# Patient Record
Sex: MALE | Race: BLACK OR AFRICAN AMERICAN | NOT HISPANIC OR LATINO
[De-identification: names, ages, dates, MRNs, and addresses within clinical notes are randomized per-mention and may not be internally consistent; named-entity substitution may affect disease eponyms.]

---

## 2018-05-08 PROBLEM — Z00.00 ENCOUNTER FOR PREVENTIVE HEALTH EXAMINATION: Status: ACTIVE | Noted: 2018-05-08

## 2019-03-11 ENCOUNTER — TRANSCRIPTION ENCOUNTER (OUTPATIENT)
Age: 39
End: 2019-03-11

## 2019-04-20 ENCOUNTER — TRANSCRIPTION ENCOUNTER (OUTPATIENT)
Age: 39
End: 2019-04-20

## 2019-04-21 ENCOUNTER — INPATIENT (INPATIENT)
Facility: HOSPITAL | Age: 39
LOS: 4 days | Discharge: HOME HEALTH | DRG: 66 | End: 2019-04-26
Admitting: HOSPITALIST
Payer: COMMERCIAL

## 2019-04-21 VITALS
DIASTOLIC BLOOD PRESSURE: 116 MMHG | OXYGEN SATURATION: 99 % | HEART RATE: 74 BPM | WEIGHT: 210.1 LBS | RESPIRATION RATE: 20 BRPM | SYSTOLIC BLOOD PRESSURE: 180 MMHG | TEMPERATURE: 98 F | HEIGHT: 72 IN

## 2019-04-21 LAB
ANION GAP SERPL CALC-SCNC: 14 MMOL/L — SIGNIFICANT CHANGE UP (ref 5–17)
APTT BLD: 33.8 SEC — SIGNIFICANT CHANGE UP (ref 27.5–36.3)
BUN SERPL-MCNC: 23 MG/DL — HIGH (ref 8–20)
CALCIUM SERPL-MCNC: 9.6 MG/DL — SIGNIFICANT CHANGE UP (ref 8.6–10.2)
CHLORIDE SERPL-SCNC: 98 MMOL/L — SIGNIFICANT CHANGE UP (ref 98–107)
CO2 SERPL-SCNC: 27 MMOL/L — SIGNIFICANT CHANGE UP (ref 22–29)
CREAT SERPL-MCNC: 1.05 MG/DL — SIGNIFICANT CHANGE UP (ref 0.5–1.3)
GLUCOSE SERPL-MCNC: 116 MG/DL — HIGH (ref 70–115)
HCT VFR BLD CALC: 44.8 % — SIGNIFICANT CHANGE UP (ref 42–52)
HGB BLD-MCNC: 14.8 G/DL — SIGNIFICANT CHANGE UP (ref 14–18)
INR BLD: 1.19 RATIO — HIGH (ref 0.88–1.16)
MCHC RBC-ENTMCNC: 27.2 PG — SIGNIFICANT CHANGE UP (ref 27–31)
MCHC RBC-ENTMCNC: 33 G/DL — SIGNIFICANT CHANGE UP (ref 32–36)
MCV RBC AUTO: 82.2 FL — SIGNIFICANT CHANGE UP (ref 80–94)
PLATELET # BLD AUTO: 317 K/UL — SIGNIFICANT CHANGE UP (ref 150–400)
POTASSIUM SERPL-MCNC: 3.7 MMOL/L — SIGNIFICANT CHANGE UP (ref 3.5–5.3)
POTASSIUM SERPL-SCNC: 3.7 MMOL/L — SIGNIFICANT CHANGE UP (ref 3.5–5.3)
PROTHROM AB SERPL-ACNC: 13.8 SEC — HIGH (ref 10–12.9)
RBC # BLD: 5.45 M/UL — SIGNIFICANT CHANGE UP (ref 4.6–6.2)
RBC # FLD: 14.8 % — SIGNIFICANT CHANGE UP (ref 11–15.6)
SODIUM SERPL-SCNC: 139 MMOL/L — SIGNIFICANT CHANGE UP (ref 135–145)
WBC # BLD: 14.4 K/UL — HIGH (ref 4.8–10.8)
WBC # FLD AUTO: 14.4 K/UL — HIGH (ref 4.8–10.8)

## 2019-04-21 PROCEDURE — 70450 CT HEAD/BRAIN W/O DYE: CPT | Mod: 26

## 2019-04-21 PROCEDURE — 93010 ELECTROCARDIOGRAM REPORT: CPT

## 2019-04-21 PROCEDURE — 99285 EMERGENCY DEPT VISIT HI MDM: CPT

## 2019-04-21 RX ORDER — MECLIZINE HCL 12.5 MG
25 TABLET ORAL ONCE
Qty: 0 | Refills: 0 | Status: COMPLETED | OUTPATIENT
Start: 2019-04-21 | End: 2019-04-21

## 2019-04-21 RX ORDER — ASPIRIN/CALCIUM CARB/MAGNESIUM 324 MG
325 TABLET ORAL ONCE
Qty: 0 | Refills: 0 | Status: COMPLETED | OUTPATIENT
Start: 2019-04-21 | End: 2019-04-21

## 2019-04-21 RX ORDER — ALBUTEROL 90 UG/1
1 AEROSOL, METERED ORAL EVERY 4 HOURS
Qty: 0 | Refills: 0 | Status: DISCONTINUED | OUTPATIENT
Start: 2019-04-21 | End: 2019-04-26

## 2019-04-21 RX ORDER — LABETALOL HCL 100 MG
10 TABLET ORAL ONCE
Qty: 0 | Refills: 0 | Status: COMPLETED | OUTPATIENT
Start: 2019-04-21 | End: 2019-04-21

## 2019-04-21 RX ORDER — TIOTROPIUM BROMIDE 18 UG/1
1 CAPSULE ORAL; RESPIRATORY (INHALATION) DAILY
Qty: 0 | Refills: 0 | Status: DISCONTINUED | OUTPATIENT
Start: 2019-04-21 | End: 2019-04-26

## 2019-04-21 RX ORDER — LISINOPRIL 2.5 MG/1
20 TABLET ORAL DAILY
Qty: 0 | Refills: 0 | Status: DISCONTINUED | OUTPATIENT
Start: 2019-04-21 | End: 2019-04-26

## 2019-04-21 RX ORDER — DIPHENHYDRAMINE HCL 50 MG
50 CAPSULE ORAL EVERY 4 HOURS
Qty: 0 | Refills: 0 | Status: DISCONTINUED | OUTPATIENT
Start: 2019-04-21 | End: 2019-04-24

## 2019-04-21 RX ORDER — HYDROCHLOROTHIAZIDE 25 MG
25 TABLET ORAL DAILY
Qty: 0 | Refills: 0 | Status: DISCONTINUED | OUTPATIENT
Start: 2019-04-21 | End: 2019-04-26

## 2019-04-21 RX ORDER — IPRATROPIUM/ALBUTEROL SULFATE 18-103MCG
3 AEROSOL WITH ADAPTER (GRAM) INHALATION EVERY 4 HOURS
Qty: 0 | Refills: 0 | Status: DISCONTINUED | OUTPATIENT
Start: 2019-04-21 | End: 2019-04-23

## 2019-04-21 RX ORDER — ONDANSETRON 8 MG/1
4 TABLET, FILM COATED ORAL ONCE
Qty: 0 | Refills: 0 | Status: COMPLETED | OUTPATIENT
Start: 2019-04-21 | End: 2019-04-21

## 2019-04-21 RX ADMIN — Medication 25 MILLIGRAM(S): at 20:54

## 2019-04-21 RX ADMIN — Medication 50 MILLIGRAM(S): at 20:55

## 2019-04-21 RX ADMIN — Medication 10 MILLIGRAM(S): at 20:54

## 2019-04-21 RX ADMIN — ONDANSETRON 4 MILLIGRAM(S): 8 TABLET, FILM COATED ORAL at 20:55

## 2019-04-21 RX ADMIN — Medication 325 MILLIGRAM(S): at 20:55

## 2019-04-21 NOTE — ED ADULT NURSE NOTE - CHPI ED NUR SYMPTOMS POS
SWELLING OF FACE, TONGUE/ANXIETY/right side of face NAUSEA/DIZZINESS/right side of face NAUSEA/DIZZINESS/swollen right side of face, weakness

## 2019-04-21 NOTE — ED ADULT TRIAGE NOTE - CHIEF COMPLAINT QUOTE
pt states took two pills and started a z pack. c/o right facial numbness, facial swelling and swelling of tongue. no drooling or difficulty breathing noted. no further compalints

## 2019-04-21 NOTE — ED PROVIDER NOTE - OBJECTIVE STATEMENT
Pt is a 37 y/o M, with PMHx of HTN, presenting to the ED with c/o suspected allergic reaction, onset today. Pt states he was recently placed on a zpak for treatment of congestion and URI like sxs. Took first dose yesterday and developed nausea and vomiting. Woke up this morning with right sided facial numbness and feeling like it is swollen and dizziness. Denies HA, CP, and SOB. NO hx of the same. Pt's BP elevated in the ED.

## 2019-04-21 NOTE — ED ADULT NURSE NOTE - OBJECTIVE STATEMENT
Pt presenting to the ED with c/o suspected allergic reaction, acute onset today. Pt states he was recently placed on a z-mary grace for treatment of congestion. Took first dose yesterday and then developed acute  nausea and vomiting. Pt reported he woke up today with right sided facial numbness and feeling like it is swollen and dizziness. Patient A/Ox4, VSS, denies chest pain or sob.  Respirations are even and unlabored, lungs cta, +bowel x4 quads, abdomen soft, nontender/nondistended, skin w/d/i. Pt presenting to the ED with c/o suspected allergic reaction, acute onset today. Pt states he was recently placed on a z-mary grace for treatment of congestion. Took first dose yesterday and then developed acute  nausea and vomiting. Pt reports leg weakness and couldn't walk and needed assistance from family members.  Pt reported he woke up today with right sided facial numbness and feeling like it is swollen and dizziness. Patient A/Ox4, VSS, denies chest pain or sob.  Respirations are even and unlabored, lungs cta, +bowel x4 quads, abdomen soft, nontender/nondistended, skin w/d/i. Pt presenting to the ED with c/o suspected allergic reaction, acute onset today. Pt states he was recently placed on a z-mary grace for treatment of congestion. Took first dose yesterday and then developed acute  nausea and vomiting. Pt reports dizziness, weakness, leg weakness and couldn't walk and needed assistance from family members.  Pt reported he woke up today with right sided facial numbness and feeling like it is swollen and dizziness. Patient A/Ox4, VSS, denies chest pain or sob.  Respirations are even and unlabored, lungs cta, +bowel x4 quads, abdomen soft, nontender/nondistended, skin w/d/i.

## 2019-04-21 NOTE — ED STATDOCS - NS_ ATTENDINGSCRIBEDETAILS _ED_A_ED_FT
I, Bridger Barakat, performed the initial face to face bedside interview with this patient regarding history of present illness, review of symptoms and relevant past medical, social and family history.  I completed an independent physical examination.    The history, relevant review of systems, past medical and surgical history, medical decision making, and physical examination was documented by the scribe in my presence and I attest to the accuracy of the documentation.

## 2019-04-21 NOTE — ED PROVIDER NOTE - CARE PLAN
Principal Discharge DX:	HTN (hypertension)  Secondary Diagnosis:	Vertigo  Secondary Diagnosis:	Paresthesia

## 2019-04-21 NOTE — ED ADULT NURSE NOTE - NSIMPLEMENTINTERV_GEN_ALL_ED
Implemented All Fall Risk Interventions:  [place] to call system. Call bell, personal items and telephone within reach. Instruct patient to call for assistance. Room bathroom lighting operational. Non-slip footwear when patient is off stretcher. Physically safe environment: no spills, clutter or unnecessary equipment. Stretcher in lowest position, wheels locked, appropriate side rails in place. Provide visual cue, wrist band, yellow gown, etc. Monitor gait and stability. Monitor for mental status changes and reorient to person, place, and time. Review medications for side effects contributing to fall risk. Reinforce activity limits and safety measures with patient and family.

## 2019-04-21 NOTE — ED STATDOCS - PROGRESS NOTE DETAILS
37 y/o M pt with hx of HTN presents to ED c/o right sided facial numbness and dizziness. Indicates he presented to urgent care yesterday; put on Z-Pac. Last night developed vomiting s/p taking Z-pac; he went to sleep. Last known normal is unclear, but he developed right sided facial numbness at 3:00 earlier today prior to going to bed. Indicates he woke up at 14:00 today with right sided on his whole body and was having difficulty ambulating Patient has slurred speech and is HTN in triage ( 180/116). Priority CT ordered.

## 2019-04-21 NOTE — ED ADULT NURSE NOTE - CHPI ED NUR SYMPTOMS NEG
no wheezing/no difficulty breathing/no rash/no difficulty swallowing/no throat itching no blurred vision

## 2019-04-22 DIAGNOSIS — I10 ESSENTIAL (PRIMARY) HYPERTENSION: ICD-10-CM

## 2019-04-22 DIAGNOSIS — I63.9 CEREBRAL INFARCTION, UNSPECIFIED: ICD-10-CM

## 2019-04-22 DIAGNOSIS — J32.9 CHRONIC SINUSITIS, UNSPECIFIED: ICD-10-CM

## 2019-04-22 LAB
CHOLEST SERPL-MCNC: 173 MG/DL — SIGNIFICANT CHANGE UP (ref 110–199)
HBA1C BLD-MCNC: 6.6 % — HIGH (ref 4–5.6)
HDLC SERPL-MCNC: 25 MG/DL — LOW
LIPID PNL WITH DIRECT LDL SERPL: 123 MG/DL — SIGNIFICANT CHANGE UP
RAPID RVP RESULT: SIGNIFICANT CHANGE UP
TOTAL CHOLESTEROL/HDL RATIO MEASUREMENT: 7 RATIO — SIGNIFICANT CHANGE UP (ref 3.4–9.6)
TRIGL SERPL-MCNC: 126 MG/DL — SIGNIFICANT CHANGE UP (ref 10–200)
TROPONIN T SERPL-MCNC: <0.01 NG/ML — SIGNIFICANT CHANGE UP (ref 0–0.06)

## 2019-04-22 PROCEDURE — 93880 EXTRACRANIAL BILAT STUDY: CPT | Mod: 26

## 2019-04-22 PROCEDURE — 70551 MRI BRAIN STEM W/O DYE: CPT | Mod: 26

## 2019-04-22 PROCEDURE — 12345: CPT | Mod: NC

## 2019-04-22 PROCEDURE — 99223 1ST HOSP IP/OBS HIGH 75: CPT

## 2019-04-22 RX ORDER — ENOXAPARIN SODIUM 100 MG/ML
40 INJECTION SUBCUTANEOUS EVERY 24 HOURS
Qty: 0 | Refills: 0 | Status: DISCONTINUED | OUTPATIENT
Start: 2019-04-22 | End: 2019-04-26

## 2019-04-22 RX ORDER — ACETAMINOPHEN 500 MG
650 TABLET ORAL EVERY 6 HOURS
Qty: 0 | Refills: 0 | Status: DISCONTINUED | OUTPATIENT
Start: 2019-04-22 | End: 2019-04-26

## 2019-04-22 RX ORDER — ASPIRIN/CALCIUM CARB/MAGNESIUM 324 MG
325 TABLET ORAL DAILY
Qty: 0 | Refills: 0 | Status: DISCONTINUED | OUTPATIENT
Start: 2019-04-22 | End: 2019-04-26

## 2019-04-22 RX ORDER — SIMVASTATIN 20 MG/1
40 TABLET, FILM COATED ORAL AT BEDTIME
Qty: 0 | Refills: 0 | Status: DISCONTINUED | OUTPATIENT
Start: 2019-04-22 | End: 2019-04-26

## 2019-04-22 RX ORDER — MECLIZINE HCL 12.5 MG
50 TABLET ORAL
Qty: 0 | Refills: 0 | Status: DISCONTINUED | OUTPATIENT
Start: 2019-04-22 | End: 2019-04-24

## 2019-04-22 RX ADMIN — Medication 3 MILLILITER(S): at 03:48

## 2019-04-22 RX ADMIN — Medication 325 MILLIGRAM(S): at 18:41

## 2019-04-22 RX ADMIN — ENOXAPARIN SODIUM 40 MILLIGRAM(S): 100 INJECTION SUBCUTANEOUS at 18:41

## 2019-04-22 RX ADMIN — Medication 3 MILLILITER(S): at 15:58

## 2019-04-22 RX ADMIN — SIMVASTATIN 40 MILLIGRAM(S): 20 TABLET, FILM COATED ORAL at 21:05

## 2019-04-22 RX ADMIN — Medication 50 MILLIGRAM(S): at 02:50

## 2019-04-22 RX ADMIN — LISINOPRIL 20 MILLIGRAM(S): 2.5 TABLET ORAL at 00:41

## 2019-04-22 RX ADMIN — Medication 3 MILLILITER(S): at 08:45

## 2019-04-22 RX ADMIN — Medication 3 MILLILITER(S): at 20:23

## 2019-04-22 RX ADMIN — Medication 25 MILLIGRAM(S): at 00:41

## 2019-04-22 RX ADMIN — Medication 50 MILLIGRAM(S): at 08:09

## 2019-04-22 RX ADMIN — Medication 1 TABLET(S): at 18:41

## 2019-04-22 RX ADMIN — Medication 50 MILLIGRAM(S): at 18:41

## 2019-04-22 NOTE — H&P ADULT - NSHPPHYSICALEXAM_GEN_ALL_CORE
Vital Signs Last 24 Hrs  T(C): 36.7 (22 Apr 2019 07:11), Max: 37.2 (21 Apr 2019 22:00)  T(F): 98.1 (22 Apr 2019 07:11), Max: 99 (21 Apr 2019 22:00)  HR: 60 (22 Apr 2019 07:11) (60 - 74)  BP: 130/80 (22 Apr 2019 07:11) (130/80 - 193/101)  BP(mean): --  RR: 18 (22 Apr 2019 07:11) (18 - 20)  SpO2: 100% (22 Apr 2019 03:50) (99% - 100%) Vital Signs Last 24 Hrs  T(C): 36.7 (22 Apr 2019 07:11), Max: 37.2 (21 Apr 2019 22:00)  T(F): 98.1 (22 Apr 2019 07:11), Max: 99 (21 Apr 2019 22:00)  HR: 60 (22 Apr 2019 07:11) (60 - 74)  BP: 130/80 (22 Apr 2019 07:11) (130/80 - 193/101)  BP(mean): --  RR: 18 (22 Apr 2019 07:11) (18 - 20)  SpO2: 100% (22 Apr 2019 03:50) (99% - 100%)    Constitutional/General: Well developed, well nourished, vitals reviewed  EYE: PERRL, conjunctiva clear  ENT: Good dentition, oropharynx clear  NECK: No masses, thyroid normal  RESP: CTAB, no wheezes, no rhonchi, normal effort  CV: RRR, normal S1S2, no murmur, 2+ DP pulses, no JVD, no edema  ABDOMEN: Soft, nontender, no HSM  LYMPH: No cervical or supraclavicular adenopathy  SKIN: Warm, dry, no rashes  NEURO: CN II-XII intact grossly, right facial light sensation diminished ; deferred gait, patient reports significant dizziness while sitting; slurred speech  PSYCHIATRIC: Oriented x3, normal mood and affect

## 2019-04-22 NOTE — SWALLOW BEDSIDE ASSESSMENT ADULT - SWALLOW EVAL: DIAGNOSIS
Oral stage WFL. Suspect pharyngeal dysphagia with thin liquids. Pharyngeal stage functional for solids and nectar thickened liquids.

## 2019-04-22 NOTE — H&P ADULT - HISTORY OF PRESENT ILLNESS
· HPI Objective Statement: Pt is a 37 y/o M, with PMHx of HTN, presenting to the ED with c/o suspected allergic reaction, onset today. Pt states he was recently placed on a zpak for treatment of congestion and URI like sxs. Took first dose yesterday and developed nausea and vomiting. Woke up this morning with right sided facial numbness and feeling like it is swollen and dizziness. Denies HA, CP, and SOB. NO hx of the same. Pt's BP elevated in the ED.  · Presenting Symptoms: right sided facial numbness/swelling, dizziness  · Negative Findings: no shortness of breath  · Location: face  · Laterality: right  · Duration: yesterday  · Relieving Factors: none    Above ED HPI reviewed and noted: patient and uncle at bedside, confirm above. patient reports having symptoms of dizziness, cough, fever, and loss of appetite for 1 week. One the day of arrival he developed acute onset vomiting with worsening dizziness causing him to seek medical attention. patient denies head trauma or fall.

## 2019-04-22 NOTE — CONSULT NOTE ADULT - SUBJECTIVE AND OBJECTIVE BOX
CHIEF COMPLAINT:  right facial numbness    HPI: 38yMale  · HPI Objective Statement: Pt is a 37 y/o M, with PMHx of HTN, presenting to the ED with c/o suspected allergic reaction, onset today. Pt states he was recently placed on a zpak for treatment of congestion and URI like sxs. Took first dose yesterday and developed nausea and vomiting. Woke up this morning with right sided facial numbness and feeling like it is swollen and dizziness. Denies HA, CP, and SOB. NO hx of the same. Pt's BP elevated in the ED.	  Patient still notes dizziness and right facial numbness    PAST MEDICAL & SURGICAL HISTORY:  HTN (hypertension)  DM?  No significant past surgical history    MEDICATIONS  (STANDING):  ALBUTerol    90 MICROgram(s) HFA Inhaler 1 Puff(s) Inhalation every 4 hours  ALBUTerol/ipratropium for Nebulization 3 milliLiter(s) Nebulizer every 4 hours  amoxicillin  875 milliGRAM(s)/clavulanate 1 Tablet(s) Oral two times a day  aspirin enteric coated 325 milliGRAM(s) Oral daily  enoxaparin Injectable 40 milliGRAM(s) SubCutaneous every 24 hours  hydrochlorothiazide 25 milliGRAM(s) Oral daily  lisinopril 20 milliGRAM(s) Oral daily  meclizine 50 milliGRAM(s) Oral two times a day  simvastatin 40 milliGRAM(s) Oral at bedtime  tiotropium 18 MICROgram(s) Capsule 1 Capsule(s) Inhalation daily    MEDICATIONS  (PRN):  acetaminophen   Tablet .. 650 milliGRAM(s) Oral every 6 hours PRN Temp greater or equal to 38C (100.4F), Mild Pain (1 - 3)  diphenhydrAMINE   Injectable 50 milliGRAM(s) IV Push every 4 hours PRN Rash and/or Itching    Allergies    No Known Allergies    Intolerances        FAMILY HISTORY:          SOCIAL HISTORY:    Tobacco:    Alcohol:    Drugs:          REVIEW OF SYSTEMS:    Relevant systems are negative except as noted in the chart, HPI, and PMH      VITAL SIGNS:  Vital Signs Last 24 Hrs  T(C): 37.1 (22 Apr 2019 11:44), Max: 37.2 (21 Apr 2019 22:00)  T(F): 98.8 (22 Apr 2019 11:44), Max: 99 (21 Apr 2019 22:00)  HR: 70 (22 Apr 2019 11:44) (60 - 74)  BP: 138/84 (22 Apr 2019 11:44) (130/80 - 193/101)  BP(mean): --  RR: 18 (22 Apr 2019 11:44) (18 - 20)  SpO2: 96% (22 Apr 2019 11:44) (96% - 100%)    PHYSICAL EXAMINATION:    General: Well-developed, well nourished, in no acute distress.  Cardiac:  Regular rate and rhythm. No carotid bruits appreciated.  Eyes: Fundoscopic examination was deferred.  Neurologic:  - Mental Status:  Alert, awake, oriented to person, place, and time; Speech is fluent. Language is normal. Follows commands well.  Insight and knowledge appear appropriate.  Cranial Nerves II-XII:    II:  Visual acuity is normal for age ; Visual fields are full to confrontation; Pupils are equal, round, and reactive to light.  III, IV, VI:  Extraocular movements are intact without nystagmus.  V:  Facial sensation is decreased on right fact  VII:  Face - right facial palsy  (peripheral pattern including the forehead  VIII:  Hearing is diminshed right ear  IX, X, XII:  speech is clear  XI:  Head turning and shoulder shrug are intact.  - Motor:  Strength is 5/5 x 4.   There is no pronator drift. .  - Reflexes:  2+ and symmetric at the knees.  Plantar responses flexor.  - Sensory:  Symmetric to light touch  - Coordination:  Finger-nose-finger and Ankle to shin show inacuraccy/dysmetria      LABS:                          14.8   14.4  )-----------( 317      ( 21 Apr 2019 21:02 )             44.8     21 Apr 2019 21:01    139    |  98     |  23.0   ----------------------------<  116    3.7     |  27.0   |  1.05     Ca    9.6        21 Apr 2019 21:01        PT/INR - ( 21 Apr 2019 21:02 )   PT: 13.8 sec;   INR: 1.19 ratio         PTT - ( 21 Apr 2019 21:02 )  PTT:33.8 sec      RADIOLOGY & ADDITIONAL STUDIES:    < from: MR Head No Cont (04.22.19 @ 11:40) >  FINDINGS:  There is focal diffusion abnormality at the level of the right middle   cerebellar peduncle consistent with acute infarct. Mild regional mass   effect. No evidence of hemorrhage. Normal size and configuration of the   ventricles and cortical sulci. The midline structures are intact.      There is normal gray-white differentiation. The corpus callosum, cortical   spinal tracts and other descending white matter tracts are intact.    There is normal configuration of cerebellar, mid brain, yaa and medulla   without tonsillar ectopia.    There is no acute hemorrhage or hydrocephalus. No extra-axial collections   are identified.    There is normal sella / parasellar structures, tectum and pineal region.    The major intra-arterial flow voids are patent.       There are scattered chronic mucosal thickening of the paranasal sinuses   and polypoid retention cysts and the maxillary sinuses bilaterally. No   air-fluid levels. Small left mastoid opacification/inflammatory changes    The visualized portions of the nasopharynx, and upper cervical spine are   grossly unremarkable.    IMPRESSION:    Acutenonhemorrhagic infarct in the right middle cerebellar peduncle.    Scattered mucosal disease of the paranasal sinuses and polypoid retention   cyst in the maxillary sinuses bilaterally.    Inflammatory changes left mastoid.          < end of copied text >    IMPRESSION:    Right cerebellar stroke in this 39 year old man with HTN and DM(?)  His exam shows a right bells palsy and hearing loss which are not directly explained by the cerebellar stroke.  Not typical of brainstem ishcemia either. -cranial nerves are involved (peripheral)      PLAN:  1. MRA brain and neck  2. MRI brain with tulio  3. May need LP to ro/o inflammatory etiology  4. Labs-Lyme, RPR, hemoglobin A1c, JASON, ANCA, sjogren ab etc  5. In meantime- stroke protocols

## 2019-04-22 NOTE — H&P ADULT - ASSESSMENT
r/o CVA, secondary to uncontrolled HTN, patient prescribed 3 antihypertensives but was only taking two as he though amlodipine was added only because he was having neck discomfort a few months ago.    - admit to medical unit  - neurological check  - dysphagia screen  - OOB with assistance  - please call neurology consult  - echocardiogram, a1c, lipid, carotid doppler  - Meclizine     H/O chronic sinusitis, recurrent  - IV Levaquin given  - possible drug reaction with right facial swelling, cont to monitor  - cont benadryl    H/O HTN  - resume hydralazine, lisinopril and amlodipine    H/o HLD  - cont Statin     DVT prophylaxes

## 2019-04-22 NOTE — PROGRESS NOTE ADULT - PROBLEM SELECTOR PLAN 1
Start aspirin, statin, neurology consulted. Check MRA head and neck, carotids US, ECHO. Neuro checks, cardiology evaluation. PT and speech/swallow evaluation. U Tox.

## 2019-04-22 NOTE — CONSULT NOTE ADULT - ASSESSMENT
A/P:  37yo AA male with PMH HTN, ?sleep apnea (per parents) presents to the ED with c/o right facial numbness since Friday.  Patient states that he went to urgent care Thursday, was started on Z-Anurag, and began having right facial numbness which he assumed was an allergic reaction. Symptoms have been progressively worsening to right eye "jumping", right sided decreased hearing, right facial numbness, right facial droop, right sided tingling and weakness, slurred speech, and mild expressive and receptive aphasia.  Per patient request, obtained further Hx and discussed situation with his parents Chacha ([phone]) and Ernesto ([phone]) who he resides with and are currently vacationing in [place] until Sunday 4/28 but can return earlier if necessary. EKG no acute ischemia. Acute nonhemorrhagic infarct in the right middle cerebellar peduncle, per MRI.

## 2019-04-22 NOTE — PROGRESS NOTE ADULT - ASSESSMENT
38 yr old male with hypertension admitted with complaints of facial numbness and dizziness. He was recently started on a course of Azithromycin for URI. On arrival, noted to have elevated BP systolic 180, CT head was performed which was negative for acute stroke, there was evidence of paranasal sinus disease and left mastoid opacification. He was started on Levofloxacin for URI. 38 yr old male with hypertension admitted with complaints of facial numbness and dizziness. He was recently started on a course of Azithromycin for URI. On arrival, noted to have elevated BP systolic 180, CT head was performed which was negative for acute stroke, there was evidence of paranasal sinus disease and left mastoid opacification. He was started on Levofloxacin for URI. 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 Neurology consulted.

## 2019-04-22 NOTE — CONSULT NOTE ADULT - SUBJECTIVE AND OBJECTIVE BOX
[place] [facility]                                                        Office: [address] [address]                                                       Telephone: [phone]. Fax:[phone]                                                              CARDIOLOGY CONSULTATION NOTE                                                                                             Consult requested by:  Dr. Fair    Reason for Consultation: Stroke    History obtained by: Patient and medical record     obtained: No    Chief complaint:    Patient is a 38y old  Male who presents with a chief complaint of dizziness (22 Apr 2019 08:19)      HPI:  39yo AA male with PMH HTN, ?sleep apnea (per parents) presents to the ED with c/o right facial numbness since Friday.  Patient states that he went to urgent care Thursday, was started on Z-Anurag, and began having right facial numbness which he assumed was an allergic reaction. Symptoms have been progressively worsening to right eye "jumping", right sided decreased hearing, right facial numbness, right facial droop, right sided tingling and weakness, slurred speech, and mild expressive and receptive aphasia.  Per patient request, obtained further Hx and discussed situation with his parents Chacha ([phone]) and Ernesto ([phone]) who he resides with and are currently vacationing in [place] until Sunday 4/28 but can return earlier if necessary.  Patient denies chest pain/pressure, palpitations, irregular and/or rapid heart beat, SOB, LIRIANO, syncope/near syncope, orthopnea, PND, cough, edema, n/v/d, hematuria, or hematochezia.        REVIEW OF SYMPTOMS: Cardiovascular:  See HPI. No chest pain,  No dyspnea,  No syncope,  No palpitations, No Orthopnea,      No Paroxsymal nocturnal dyspnea;  Respiratory:  No Dyspnea, No cough,     Genitourinary:  No dysuria, no hematuria; Gastrointestinal:  No nausea, no vomiting. No diarrhea.  No abdominal pain. No dark color stool, no melena ; Neurological: See HPI  Psychiatric: No agitation, no anxiety.  ALL OTHER REVIEW OF SYSTEMS ARE NEGATIVE.    ALLERGIES: Allergies    No Known Allergies    Intolerances          CURRENT MEDICATIONS:  hydrochlorothiazide 25 milliGRAM(s) Oral daily  lisinopril 20 milliGRAM(s) Oral daily    ALBUTerol    90 MICROgram(s) HFA Inhaler  ALBUTerol/ipratropium for Nebulization  tiotropium 18 MICROgram(s) Capsule   meclizine  amoxicillin  875 milliGRAM(s)/clavulanate  aspirin enteric coated  enoxaparin Injectable  simvastatin      HOME MEDICATIONS:  Lisinopril    PAST MEDICAL HISTORY  HTN (hypertension)      PAST SURGICAL HISTORY  No significant past surgical history      FAMILY HISTORY:  Mom- HTN, Pulm. HTN, MI w/2stents at age 60  Father- HTN  Paternal Grandfather- CHF      SOCIAL HISTORY:  lives with parents,     CIGARETTES:  denies     ALCOHOL: denies     DRUGS: denies    Vital Signs Last 24 Hrs  T(C): 37.1 (22 Apr 2019 11:44), Max: 37.2 (21 Apr 2019 22:00)  T(F): 98.8 (22 Apr 2019 11:44), Max: 99 (21 Apr 2019 22:00)  HR: 70 (22 Apr 2019 11:44) (60 - 74)  BP: 138/84 (22 Apr 2019 11:44) (130/80 - 193/101)  BP(mean): --  RR: 18 (22 Apr 2019 11:44) (18 - 20)  SpO2: 96% (22 Apr 2019 11:44) (96% - 100%)      PHYSICAL EXAM:  Constitutional: Comfortable . No acute distress.   HEENT: Atraumatic and normocephalic , neck is supple . no JVD. No carotid bruit. PEERL   CNS: A&Ox3. Slurred speech, mild expressive and receptive aphasia, Right facial droop, right facial decreased sensation, Right sided weakness and decreased sensation. EOMI.   Lymph Nodes: Cervical : Not palpable.  Respiratory: CTAB  Cardiovascular: S1S2 RRR. No murmur/rubs or gallop.  Gastrointestinal: Soft non-tender and non distended . +Bowel sounds. negative Trevino's sign.  Extremities: No edema.   Psychiatric: Calm . no agitation.  Skin: No skin rash/ulcers visualized to face, hands or feet.    Intake and output:     LABS:                        14.8   14.4  )-----------( 317      ( 21 Apr 2019 21:02 )             44.8     04-21    139  |  98  |  23.0<H>  ----------------------------<  116<H>  3.7   |  27.0  |  1.05    Ca    9.6      21 Apr 2019 21:01      CARDIAC MARKERS ( 22 Apr 2019 05:44 )  x     / <0.01 ng/mL / x     / x     / x      CARDIAC MARKERS ( 21 Apr 2019 21:01 )  x     / <0.01 ng/mL / x     / x     / x        ;p-BNP=  PT/INR - ( 21 Apr 2019 21:02 )   PT: 13.8 sec;   INR: 1.19 ratio         PTT - ( 21 Apr 2019 21:02 )  PTT:33.8 sec      INTERPRETATION OF TELEMETRY: Sinus bradycardia, 50s, lowest 48bpm   ECG: NSR at 72bpm. NSST abnormalities    RADIOLOGY & ADDITIONAL STUDIES:   CT scan:   < from: CT Head No Cont (04.21.19 @ 18:54) >  Impression:     No acute intracranial hemorrhage, large cortical infarct or mass effect.   If clinically indicated, short-term follow-up or MRI may be obtained for   further evaluation.    Paranasal sinus disease. Nonspecific opacification of the left mastoid   air cells and middle ear cavity. Recommend clinical correlation to assess   acute sinusitis/otomastoiditis.    < end of copied text >    MRI:   < from: MR Head No Cont (04.22.19 @ 11:40) >  IMPRESSION:    Acutenonhemorrhagic infarct in the right middle cerebellar peduncle.    Scattered mucosal disease of the paranasal sinuses and polypoid retention   cyst in the maxillary sinuses bilaterally.    Inflammatory changes left mastoid.    < end of copied text >

## 2019-04-22 NOTE — SWALLOW BEDSIDE ASSESSMENT ADULT - SLP PERTINENT HISTORY OF CURRENT PROBLEM
38 yr old male with hypertension admitted with complaints of facial numbness and dizziness. He was recently started on a course of Azithromycin for URI. On arrival, noted to have elevated BP systolic 180, CT head was performed which was negative for acute stroke, there was evidence of paranasal sinus disease and left mastoid opacification. He was started on Levofloxacin for URI. 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

## 2019-04-22 NOTE — CONSULT NOTE ADULT - PROBLEM SELECTOR RECOMMENDATION 9
- EKG, no acute ischemic changes  - Tele, sinus rand  - CT head, neg  - MRI head, Acute nonhemorrhagic infarct in the right middle cerebellar peduncle.  - TTE, with bubble study  - NPO after MN  - LUZ MARINA at 1000 4/23  - u/s carotid  - MRA head and neck  - c/w ASA, Statin, HCTZ, and Lisinopril  - recommend neuro consult

## 2019-04-22 NOTE — CONSULT NOTE ADULT - ATTENDING COMMENTS
stroke. right facial numbness and weakness. transesophageal echocardiogram   Transthoracic echocardiogram with bubbles study. aspri, statins.   nothing per orally after midnight.

## 2019-04-22 NOTE — SWALLOW BEDSIDE ASSESSMENT ADULT - ASR SWALLOW ASPIRATION MONITOR
pneumonia/throat clearing/oral hygiene/gurgly voice/change of breathing pattern/position upright (90Y)/cough/fever

## 2019-04-23 ENCOUNTER — TRANSCRIPTION ENCOUNTER (OUTPATIENT)
Age: 39
End: 2019-04-23

## 2019-04-23 DIAGNOSIS — E11.9 TYPE 2 DIABETES MELLITUS WITHOUT COMPLICATIONS: ICD-10-CM

## 2019-04-23 DIAGNOSIS — Z29.9 ENCOUNTER FOR PROPHYLACTIC MEASURES, UNSPECIFIED: ICD-10-CM

## 2019-04-23 LAB
ANION GAP SERPL CALC-SCNC: 14 MMOL/L — SIGNIFICANT CHANGE UP (ref 5–17)
BUN SERPL-MCNC: 22 MG/DL — HIGH (ref 8–20)
CALCIUM SERPL-MCNC: 9.2 MG/DL — SIGNIFICANT CHANGE UP (ref 8.6–10.2)
CHLORIDE SERPL-SCNC: 96 MMOL/L — LOW (ref 98–107)
CO2 SERPL-SCNC: 26 MMOL/L — SIGNIFICANT CHANGE UP (ref 22–29)
CREAT SERPL-MCNC: 1.05 MG/DL — SIGNIFICANT CHANGE UP (ref 0.5–1.3)
GLUCOSE SERPL-MCNC: 125 MG/DL — HIGH (ref 70–115)
HCT VFR BLD CALC: 43.6 % — SIGNIFICANT CHANGE UP (ref 42–52)
HGB BLD-MCNC: 14.3 G/DL — SIGNIFICANT CHANGE UP (ref 14–18)
MAGNESIUM SERPL-MCNC: 2.4 MG/DL — SIGNIFICANT CHANGE UP (ref 1.6–2.6)
MCHC RBC-ENTMCNC: 27.6 PG — SIGNIFICANT CHANGE UP (ref 27–31)
MCHC RBC-ENTMCNC: 32.8 G/DL — SIGNIFICANT CHANGE UP (ref 32–36)
MCV RBC AUTO: 84 FL — SIGNIFICANT CHANGE UP (ref 80–94)
PHOSPHATE SERPL-MCNC: 3.2 MG/DL — SIGNIFICANT CHANGE UP (ref 2.4–4.7)
PLATELET # BLD AUTO: 269 K/UL — SIGNIFICANT CHANGE UP (ref 150–400)
POTASSIUM SERPL-MCNC: 4.1 MMOL/L — SIGNIFICANT CHANGE UP (ref 3.5–5.3)
POTASSIUM SERPL-SCNC: 4.1 MMOL/L — SIGNIFICANT CHANGE UP (ref 3.5–5.3)
RBC # BLD: 5.19 M/UL — SIGNIFICANT CHANGE UP (ref 4.6–6.2)
RBC # FLD: 14.5 % — SIGNIFICANT CHANGE UP (ref 11–15.6)
SODIUM SERPL-SCNC: 136 MMOL/L — SIGNIFICANT CHANGE UP (ref 135–145)
WBC # BLD: 11.3 K/UL — HIGH (ref 4.8–10.8)
WBC # FLD AUTO: 11.3 K/UL — HIGH (ref 4.8–10.8)

## 2019-04-23 PROCEDURE — 99233 SBSQ HOSP IP/OBS HIGH 50: CPT

## 2019-04-23 RX ORDER — SODIUM CHLORIDE 9 MG/ML
1000 INJECTION INTRAMUSCULAR; INTRAVENOUS; SUBCUTANEOUS
Qty: 0 | Refills: 0 | Status: DISCONTINUED | OUTPATIENT
Start: 2019-04-23 | End: 2019-04-25

## 2019-04-23 RX ADMIN — Medication 50 MILLIGRAM(S): at 17:43

## 2019-04-23 RX ADMIN — SIMVASTATIN 40 MILLIGRAM(S): 20 TABLET, FILM COATED ORAL at 21:33

## 2019-04-23 RX ADMIN — LISINOPRIL 20 MILLIGRAM(S): 2.5 TABLET ORAL at 05:14

## 2019-04-23 RX ADMIN — Medication 1 TABLET(S): at 17:43

## 2019-04-23 RX ADMIN — Medication 3 MILLILITER(S): at 11:40

## 2019-04-23 RX ADMIN — Medication 325 MILLIGRAM(S): at 12:06

## 2019-04-23 RX ADMIN — ENOXAPARIN SODIUM 40 MILLIGRAM(S): 100 INJECTION SUBCUTANEOUS at 17:43

## 2019-04-23 RX ADMIN — Medication 25 MILLIGRAM(S): at 05:14

## 2019-04-23 RX ADMIN — Medication 200 MILLIGRAM(S): at 17:43

## 2019-04-23 RX ADMIN — Medication 50 MILLIGRAM(S): at 05:14

## 2019-04-23 RX ADMIN — Medication 1 TABLET(S): at 05:13

## 2019-04-23 RX ADMIN — SODIUM CHLORIDE 60 MILLILITER(S): 9 INJECTION INTRAMUSCULAR; INTRAVENOUS; SUBCUTANEOUS at 19:09

## 2019-04-23 NOTE — PROGRESS NOTE ADULT - ATTENDING COMMENTS
Patient seen and examined at bedside. Agree with above A/P. No overnight events, right facial numbness unchanged. No new complaints. Labs and imaging noted.   Vitals noted.  On exam,  alert, right facial droop, decreased sensation  lungs: b/l air entry  cvs: reg  abdo: soft, BS+, non tender  ext: no tenderness, edema.  imp:  acute cerebellar stroke  hypertension  diabetes mellitus  sinusitis  plan:  continue aspirin and statin  LUZ MARINA and MRA head and neck pending  on soft with nectar thick  continue antibiotics, outpatient ENT eval.  PT/PMR eval.  Discussed with patient, RN and PA.

## 2019-04-23 NOTE — PROGRESS NOTE ADULT - PROBLEM SELECTOR PLAN 1
nothing per orally after midnight. transesophageal echocardiogram evaluate for cardioembolic cause of CVA.  aspirin, statins.   ct telemetry. LUZ MARINA tomorrow. nothing per orally after midnight.

## 2019-04-23 NOTE — PHYSICAL THERAPY INITIAL EVALUATION ADULT - ACTIVE RANGE OF MOTION EXAMINATION, REHAB EVAL
right hip flexion 4/5/bilateral  lower extremity Active ROM was WFL (within functional limits)/bilateral upper extremity Active ROM was WFL (within functional limits)

## 2019-04-23 NOTE — PROGRESS NOTE ADULT - ASSESSMENT
38 yr old male with hypertension admitted with complaints of facial numbness and dizziness. He was recently started on a course of Azithromycin for URI. On arrival, noted to have elevated BP systolic 180, CT head was performed which was negative for acute stroke, there was evidence of paranasal sinus disease and left mastoid opacification. He was started on Levofloxacin for URI. 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 Neurology consulted.

## 2019-04-23 NOTE — DISCHARGE NOTE NURSING/CASE MANAGEMENT/SOCIAL WORK - NSDCDPATPORTLINK_GEN_ALL_CORE
You can access the [facility] Patient Portal, offered by [facility], by registering with the following website: [email or web address]

## 2019-04-23 NOTE — PROGRESS NOTE ADULT - PROBLEM SELECTOR PLAN 1
Started aspirin, statin. Neurology consult appreciated. Carotid US without stenosis. MRI head w JAVIER, MRA head and neck, ECHO pending. Plan for LUZ MARINA w bubble study tomorrow. NPO after midnight. Neuro checks, cardiology evaluation pending. Speech eval appreciated- rec soft w nectar. PT eval appreciated- rec home w PT, home w assist. HgA1C= 6.6. MAG=935. Nutrition consult, pt w no PMH DM. Started aspirin, statin. Neurology consult appreciated. Carotid US without stenosis. MRI head w JAVIER, MRA head and neck, ECHO pending. Cardiology consult appreciated. Plan for TTE w bubble study today. Cont neuro checks. Speech eval appreciated- rec soft w nectar. PT eval appreciated- rec home w PT, home w assist. HgA1C= 6.6. QNN=156.

## 2019-04-23 NOTE — PHYSICAL THERAPY INITIAL EVALUATION ADULT - ADDITIONAL COMMENTS
as per pt. lives in the house with parents, (+) 4 steps to enter (+) rail, (-) DME, parents available to assist upon D/C home

## 2019-04-23 NOTE — DISCHARGE NOTE NURSING/CASE MANAGEMENT/SOCIAL WORK - NSDCPEPTSTRK_GEN_ALL_CORE
Stroke support groups for patients, families, and friends/Prescribed medications/Risk factors for stroke/Stroke education booklet/Stroke warning signs and symptoms/Signs and symptoms of stroke/Need for follow up after discharge/Call 911 for stroke

## 2019-04-23 NOTE — PROGRESS NOTE ADULT - PROBLEM SELECTOR PLAN 4
Lovenox 40 mg subcut Diabetes educator and nutrition consult placed as this patient has no PMH DM. CCD diet.

## 2019-04-23 NOTE — PHYSICAL THERAPY INITIAL EVALUATION ADULT - CRITERIA FOR SKILLED THERAPEUTIC INTERVENTIONS
risk reduction/prevention/rehab potential/therapy frequency/functional limitations in following categories/anticipated equipment needs at discharge/predicted duration of therapy intervention/impairments found/anticipated discharge recommendation

## 2019-04-23 NOTE — PHYSICAL THERAPY INITIAL EVALUATION ADULT - DIAGNOSIS, PT EVAL
Impaired Functional Mobility due to Acute nonhemorrhagic infarct in the right middle cerebellar peduncle

## 2019-04-24 DIAGNOSIS — J06.9 ACUTE UPPER RESPIRATORY INFECTION, UNSPECIFIED: ICD-10-CM

## 2019-04-24 LAB
ANION GAP SERPL CALC-SCNC: 13 MMOL/L — SIGNIFICANT CHANGE UP (ref 5–17)
AUTO DIFF PNL BLD: NEGATIVE — SIGNIFICANT CHANGE UP
B BURGDOR C6 AB SER-ACNC: NEGATIVE — SIGNIFICANT CHANGE UP
B BURGDOR IGG+IGM SER-ACNC: 0.31 INDEX — SIGNIFICANT CHANGE UP (ref 0.01–0.89)
BUN SERPL-MCNC: 23 MG/DL — HIGH (ref 8–20)
C-ANCA SER-ACNC: NEGATIVE — SIGNIFICANT CHANGE UP
CALCIUM SERPL-MCNC: 8.9 MG/DL — SIGNIFICANT CHANGE UP (ref 8.6–10.2)
CHLORIDE SERPL-SCNC: 100 MMOL/L — SIGNIFICANT CHANGE UP (ref 98–107)
CO2 SERPL-SCNC: 26 MMOL/L — SIGNIFICANT CHANGE UP (ref 22–29)
CREAT SERPL-MCNC: 0.89 MG/DL — SIGNIFICANT CHANGE UP (ref 0.5–1.3)
DSDNA AB FLD-ACNC: <0.2 AI — SIGNIFICANT CHANGE UP
ENA SS-A AB FLD IA-ACNC: <0.2 AI — SIGNIFICANT CHANGE UP
GLUCOSE SERPL-MCNC: 102 MG/DL — SIGNIFICANT CHANGE UP (ref 70–115)
HCT VFR BLD CALC: 42.3 % — SIGNIFICANT CHANGE UP (ref 42–52)
HGB BLD-MCNC: 13.7 G/DL — LOW (ref 14–18)
MAGNESIUM SERPL-MCNC: 2.3 MG/DL — SIGNIFICANT CHANGE UP (ref 1.6–2.6)
MCHC RBC-ENTMCNC: 27.2 PG — SIGNIFICANT CHANGE UP (ref 27–31)
MCHC RBC-ENTMCNC: 32.4 G/DL — SIGNIFICANT CHANGE UP (ref 32–36)
MCV RBC AUTO: 84.1 FL — SIGNIFICANT CHANGE UP (ref 80–94)
P-ANCA SER-ACNC: NEGATIVE — SIGNIFICANT CHANGE UP
PHOSPHATE SERPL-MCNC: 3.4 MG/DL — SIGNIFICANT CHANGE UP (ref 2.4–4.7)
PLATELET # BLD AUTO: 256 K/UL — SIGNIFICANT CHANGE UP (ref 150–400)
POTASSIUM SERPL-MCNC: 3.6 MMOL/L — SIGNIFICANT CHANGE UP (ref 3.5–5.3)
POTASSIUM SERPL-SCNC: 3.6 MMOL/L — SIGNIFICANT CHANGE UP (ref 3.5–5.3)
RBC # BLD: 5.03 M/UL — SIGNIFICANT CHANGE UP (ref 4.6–6.2)
RBC # FLD: 14.5 % — SIGNIFICANT CHANGE UP (ref 11–15.6)
SODIUM SERPL-SCNC: 139 MMOL/L — SIGNIFICANT CHANGE UP (ref 135–145)
T PALLIDUM AB TITR SER: NEGATIVE — SIGNIFICANT CHANGE UP
WBC # BLD: 10.9 K/UL — HIGH (ref 4.8–10.8)
WBC # FLD AUTO: 10.9 K/UL — HIGH (ref 4.8–10.8)

## 2019-04-24 PROCEDURE — 70552 MRI BRAIN STEM W/DYE: CPT | Mod: 26

## 2019-04-24 PROCEDURE — 99223 1ST HOSP IP/OBS HIGH 75: CPT

## 2019-04-24 PROCEDURE — 99233 SBSQ HOSP IP/OBS HIGH 50: CPT

## 2019-04-24 PROCEDURE — 93312 ECHO TRANSESOPHAGEAL: CPT | Mod: 26

## 2019-04-24 PROCEDURE — 71045 X-RAY EXAM CHEST 1 VIEW: CPT | Mod: 26

## 2019-04-24 PROCEDURE — 70548 MR ANGIOGRAPHY NECK W/DYE: CPT | Mod: 26

## 2019-04-24 PROCEDURE — 93325 DOPPLER ECHO COLOR FLOW MAPG: CPT | Mod: 26

## 2019-04-24 PROCEDURE — 93320 DOPPLER ECHO COMPLETE: CPT | Mod: 26

## 2019-04-24 PROCEDURE — 99232 SBSQ HOSP IP/OBS MODERATE 35: CPT

## 2019-04-24 PROCEDURE — 70544 MR ANGIOGRAPHY HEAD W/O DYE: CPT | Mod: 26,59

## 2019-04-24 RX ORDER — NEOMYCIN/POLYMYXIN B/DEXAMETHA 0.1 %
1 SUSPENSION, DROPS(FINAL DOSAGE FORM)(ML) OPHTHALMIC (EYE) THREE TIMES A DAY
Qty: 0 | Refills: 0 | Status: DISCONTINUED | OUTPATIENT
Start: 2019-04-24 | End: 2019-04-26

## 2019-04-24 RX ORDER — FLUTICASONE PROPIONATE 50 MCG
1 SPRAY, SUSPENSION NASAL
Qty: 0 | Refills: 0 | Status: DISCONTINUED | OUTPATIENT
Start: 2019-04-24 | End: 2019-04-26

## 2019-04-24 RX ORDER — ALBUTEROL 90 UG/1
2.5 AEROSOL, METERED ORAL EVERY 6 HOURS
Qty: 0 | Refills: 0 | Status: DISCONTINUED | OUTPATIENT
Start: 2019-04-24 | End: 2019-04-26

## 2019-04-24 RX ADMIN — ENOXAPARIN SODIUM 40 MILLIGRAM(S): 100 INJECTION SUBCUTANEOUS at 18:12

## 2019-04-24 RX ADMIN — Medication 100 MILLIGRAM(S): at 21:03

## 2019-04-24 RX ADMIN — ALBUTEROL 2.5 MILLIGRAM(S): 90 AEROSOL, METERED ORAL at 20:00

## 2019-04-24 RX ADMIN — Medication 1 SPRAY(S): at 17:36

## 2019-04-24 RX ADMIN — Medication 200 MILLIGRAM(S): at 18:11

## 2019-04-24 RX ADMIN — SIMVASTATIN 40 MILLIGRAM(S): 20 TABLET, FILM COATED ORAL at 21:03

## 2019-04-24 RX ADMIN — SODIUM CHLORIDE 60 MILLILITER(S): 9 INJECTION INTRAMUSCULAR; INTRAVENOUS; SUBCUTANEOUS at 20:10

## 2019-04-24 RX ADMIN — Medication 1 TABLET(S): at 17:35

## 2019-04-24 RX ADMIN — Medication 1 DROP(S): at 16:42

## 2019-04-24 RX ADMIN — Medication 1 DROP(S): at 21:03

## 2019-04-24 RX ADMIN — Medication 325 MILLIGRAM(S): at 16:08

## 2019-04-24 NOTE — PROGRESS NOTE ADULT - ATTENDING COMMENTS
Patient seen and examined at bedside. Agree with above A/P by PA. Exam unchanged, right facial numbness and weakness. Labs and imaging noted.  On exam,  alert, right facial numbness, slurred speech  lungs: b/l air entry  cvs: reg  abdo: soft, BS+  ext: no edema, tenderness  imp:  acute cerebellar stroke  diabetes mellitus  hypertension  sinusitis  plan:  continue aspirin and statin  MRA performed today  Needs speech therapy and follow up to from swallow standpoint if diet could be upgraded.  continue antihypertensives and monitor FS.  discussed plan of care and follow up at length with patient's aunt Melani at bedside.  LUZ MARINA to be performed today.  Neuro wise stable, can be downgraded to telemetry.  Discussed with patient, PA, family and RN. Patient seen and examined at bedside. Agree with above A/P by PA. Exam unchanged, right facial numbness and weakness. Labs and imaging noted.  On exam,  alert, right facial numbness, slurred speech  lungs: b/l air entry  cvs: reg  abdo: soft, BS+  ext: no edema, tenderness  imp:  acute cerebellar stroke  diabetes mellitus  hypertension  sinusitis  plan:  continue aspirin and statin  MRA performed today  Needs speech therapy and follow up to from swallow standpoint if diet could be upgraded.  continue antihypertensives and monitor FS.  discussed plan of care and follow up at length with patient's aunt Melani at bedside.  LUZ MARINA to be performed today.  Neuro wise stable, can be downgraded to telemetry.  Discussed with patient, PA, family and RN.  Spoke with patient's mother Chacha at patient request to update plan of care.

## 2019-04-24 NOTE — PROGRESS NOTE ADULT - PROBLEM SELECTOR PLAN 1
Urgent MRI 4/22/19 brain showed acute non hemorrhagic right middle cerebellar peduncle stroke..  Started aspirin, statin. Neurology consult appreciated. Carotid US without stenosis. MRI head w JAVIER 4/24/19 shows stable right middle cerebellar peduncle stroke. MRA head and neck 4/24/19 are unremarkable. Cardiology consult appreciated. Plan for LUZ MARINA today. Neuro checks changed to q4h as exam has remained stable.  Speech initial eval rec soft w nectar. After LUZ MARINA pt to be reevaluated by speech and hopefully upgrade diet. PT and P,M&R eval appreciated- rec home w PT, home w assist. HgA1C= 6.6. QQJ=025.

## 2019-04-24 NOTE — PROGRESS NOTE ADULT - PROBLEM SELECTOR PLAN 6
Pt continues to have cough, nasal congestion and right eye conjunctivitis. Will order nebs, flonase and ophthalmologic abx drops. Pt continues to have cough, nasal congestion and right eye conjunctivitis. Will order nebs, flonase and ophthalmologic abx drops. NO fever or leukocytosis. RVP negative.

## 2019-04-24 NOTE — DIETITIAN INITIAL EVALUATION ADULT. - OTHER INFO
Pt admitted for acute CVA, HTN. New DM diagnosis upon admission. Pt tolerating diet, intake good 100% PO. Provided heart healthy, T2DM nutrition literature, left at bedside. RD to return to discuss diet with Patient.

## 2019-04-24 NOTE — CONSULT NOTE ADULT - SUBJECTIVE AND OBJECTIVE BOX
38yM was admitted on 04-22 with complaint of possibly having an allergic reaction to ZPAK, he was placed on for URI like symptoms. He awoke with right facial numbness and weakness. RVR done was negative.     Imaging showed (ind reviewed):  HEAD CT - No acute intracranial hemorrhage, large cortical infarct or mass effect. If clinically indicated, short-term follow-up or MRI may be obtained for further evaluation. Paranasal sinus disease. Nonspecific opacification of the left mastoid air cells and middle ear cavity. Recommend clinical correlation to assess acute sinusitis/otomastoiditis.    BRAIN MRI - Acute nonhemorrhagic infarct in the right middle cerebellar peduncle. Scattered mucosal disease of the paranasal sinuses and polypoid retention cyst in the maxillary sinuses bilaterally. Inflammatory changes left mastoid.    Patient now planned for TTE.     Patient is in bed, reports ongoing puffiness and weakness of the right side of face. Reports he has good strength, but both sides of his arms and legs feel poorly coordinated.     REVIEW OF SYSTEMS  Constitutional - No fever, No weight loss, +fatigue  HEENT - No eye pain, No visual disturbances, +difficulty hearing, No tinnitus, No vertigo, No neck pain  Respiratory - No cough, No wheezing, No shortness of breath  Cardiovascular - No chest pain, No palpitations  Gastrointestinal - No abdominal pain, No nausea, No vomiting, No diarrhea, No constipation  Genitourinary - No dysuria, No frequency, No hematuria, No incontinence  Neurological - No headaches, No memory loss, +loss of strength, +numbness, No tremors  Skin - No itching, No rashes, No lesions   Endocrine - No temperature intolerance  Musculoskeletal - No joint pain, +joint swelling, No muscle pain  Psychiatric - No depression, No anxiety    VITALS  T(C): 37.1 (04-24-19 @ 08:06), Max: 37.4 (04-23-19 @ 14:28)  HR: 82 (04-24-19 @ 08:00) (58 - 93)  BP: 132/79 (04-24-19 @ 08:00) (123/92 - 138/86)  RR: 14 (04-24-19 @ 08:00) (13 - 17)  SpO2: 99% (04-24-19 @ 08:00) (95% - 100%)  Wt(kg): --    PAST MEDICAL & SURGICAL HISTORY  Recurrent sinusitis  HTN (hypertension)  No significant past surgical history      SOCIAL HISTORY  Smoking - Denied  EtOH - Denied   Drugs - Denied    FUNCTIONAL HISTORY  Lives with parents, 5 [place]  Independent, works in [employer] and drives    CURRENT FUNCTIONAL STATUS  4/23  Bed Mobility: Rolling/Turning:     · Level of [place]	independent	    Bed Mobility: Scooting/Bridging:     · Level of [place]	supervision	    Bed Mobility: Supine to Sit:     · Level of [place]	supervision	  · Physical Assist/Nonphysical Assist	verbal cues; nonverbal cues (demo/gestures)	    Bed Mobility Analysis:     · Impairments Contributing to Impaired Bed Mobility	impaired coordination	    Transfer: Sit to Stand:     · Level of [place]	contact guard	  · Physical Assist/Nonphysical Assist	verbal cues; nonverbal cues (demo/gestures)	  · Weight-Bearing Restrictions	full weight-bearing	  · Assistive Device	hand held assist	    Transfer: Stand to Sit:     · Level of [place]	contact guard	  · Physical Assist/Nonphysical Assist	nonverbal cues (demo/gestures); verbal cues	  · Weight-Bearing Restrictions	full weight-bearing	  · Assistive Device	hand held assist	    Sit/Stand Transfer Safety Analysis:     · Transfer Safety Concerns Noted	decreased balance during turns	  · Impairments Contributing to Impaired Transfers	impaired balance; decreased strength	    Gait Skills:     · Level of [place]	minimum assist (75% patients effort)	  · Physical Assist/Nonphysical Assist	1 person assist; nonverbal cues (demo/gestures); verbal cues	  · Weight-Bearing Restrictions	full weight-bearing	  · Assistive Device	hand held assist	  · Gait Distance	40 feet	      FAMILY HISTORY   No pertinent family history in first degree relatives      RECENT LABS/IMAGING  CBC Full  -  ( 24 Apr 2019 05:06 )  WBC Count : 10.9 K/uL  RBC Count : 5.03 M/uL  Hemoglobin : 13.7 g/dL  Hematocrit : 42.3 %  Platelet Count - Automated : 256 K/uL  Mean Cell Volume : 84.1 fl  Mean Cell Hemoglobin : 27.2 pg  Mean Cell Hemoglobin Concentration : 32.4 g/dL  Auto Neutrophil # : x  Auto Lymphocyte # : x  Auto Monocyte # : x  Auto Eosinophil # : x  Auto Basophil # : x  Auto Neutrophil % : x  Auto Lymphocyte % : x  Auto Monocyte % : x  Auto Eosinophil % : x  Auto Basophil % : x    04-24    139  |  100  |  23.0<H>  ----------------------------<  102  3.6   |  26.0  |  0.89    Ca    8.9      24 Apr 2019 05:06  Phos  3.4     04-24  Mg     2.3     04-24          ALLERGIES  No Known Allergies      MEDICATIONS   acetaminophen   Tablet .. 650 milliGRAM(s) Oral every 6 hours PRN  ALBUTerol    90 MICROgram(s) HFA Inhaler 1 Puff(s) Inhalation every 4 hours  amoxicillin  875 milliGRAM(s)/clavulanate 1 Tablet(s) Oral two times a day  aspirin enteric coated 325 milliGRAM(s) Oral daily  diphenhydrAMINE   Injectable 50 milliGRAM(s) IV Push every 4 hours PRN  enoxaparin Injectable 40 milliGRAM(s) SubCutaneous every 24 hours  hydrochlorothiazide 25 milliGRAM(s) Oral daily  lisinopril 20 milliGRAM(s) Oral daily  meclizine 50 milliGRAM(s) Oral two times a day  simvastatin 40 milliGRAM(s) Oral at bedtime  sodium chloride 0.9%. 1000 milliLiter(s) IV Continuous <Continuous>  tiotropium 18 MICROgram(s) Capsule 1 Capsule(s) Inhalation daily      ----------------------------------------------------------------------------------------  PHYSICAL EXAM  Constitutional - NAD, Comfortable  HEENT - Right facial swelling  Neck - Supple, No limited ROM  Chest - Breathing comfortably, No wheezing  Cardiovascular - S1S2   Abdomen - Soft   Extremities - No C/C/E, No calf tenderness   Neurologic Exam -                    Cognitive - Awake, Alert, AAO to self, place, date, year, situation     Communication - Fluent, +dysarthria     Cranial Nerves - Right peripheral facial weakness     Motor - No focal deficits                    LEFT    UE - ShAB 5/5, EF 5/5, EE 5/5, WE 5/5,  5/5                    RIGHT UE - ShAB 5/5, EF 5/5, EE 5/5, WE 5/5,  5/5                    LEFT    LE - HF 5/5, KE 5/5, DF 5/5, PF 5/5                    RIGHT LE - HF 5/5, KE 5/5, DF 5/5, PF 5/5        Sensory - Intact to LT     Coordination - Impaired on the right      Balance - WNL Static  Psychiatric - Mood stable, Affect WNL  ----------------------------------------------------------------------------------------  ASSESSMENT/PLAN  38yMale with functional deficits after an acute cerebellar CVA and Bell's Palsy/sinusitis  Acute CVA - Zocor, ASA, DC Meclizine as it does not functionally improve patient, needs aggressive vestibular therapy without SE of medications (including fatigue) - patient not reporting impairment  Acute sinusitis - Augmentin  HTN - Lisinopril  Pain - Tylenol  Bell's Palsy - Soft/Nectar - likely progress to MS/Thins given dysfunction is orally  DVT PPX - SCDs, Lovenox  Rehab - Expect patient to achieve functional goals for DC HOME with OUTPATIENT    Will continue to follow. Functional progress will determine rehab dispo recommendations.     Continue bedside therapy as well as OOB throughout the day with mobilization by staff to maintain cardiopulmonary function and prevention of secondary complications related to debility.

## 2019-04-24 NOTE — PROGRESS NOTE ADULT - PROBLEM SELECTOR PLAN 4
Diabetes educator and nutrition consult placed as this patient has no PMH DM. CCD diet.  4/24 Will call endocrine consult as DM educator unavailable and this is new diagnosis diabetes

## 2019-04-24 NOTE — DIETITIAN INITIAL EVALUATION ADULT. - NS AS NUTRI INTERV ED CONTENT
Nutrition relationship to health/disease/T2DM, heart healthy diet education left at bedside- RD to follow up with verbal education when Pt returns

## 2019-04-24 NOTE — PROGRESS NOTE ADULT - ASSESSMENT
38 yr old male with hypertension admitted with complaints of facial numbness and dizziness. He was recently started on a course of Azithromycin for URI. On arrival, noted to have elevated BP systolic 180, CT head was performed which was negative for acute stroke, there was evidence of paranasal sinus disease and left mastoid opacification. He was started on Levofloxacin for URI. 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 Neurology consulted.  Plan as below:    DISPOSITION: DOWNGRADE TO TELEMETRY. MRI today w stable infarct. MRA head and neck wnl. Further management per cardiology, neurology, hospitalist team. Awaiting endocrinology consult for new onset diabetes.

## 2019-04-25 ENCOUNTER — TRANSCRIPTION ENCOUNTER (OUTPATIENT)
Age: 39
End: 2019-04-25

## 2019-04-25 LAB
ACE SERPL-CCNC: 11 U/L — LOW (ref 14–82)
ANA TITR SER: NEGATIVE — SIGNIFICANT CHANGE UP
ANION GAP SERPL CALC-SCNC: 14 MMOL/L — SIGNIFICANT CHANGE UP (ref 5–17)
BUN SERPL-MCNC: 20 MG/DL — SIGNIFICANT CHANGE UP (ref 8–20)
CALCIUM SERPL-MCNC: 9.4 MG/DL — SIGNIFICANT CHANGE UP (ref 8.6–10.2)
CHLORIDE SERPL-SCNC: 98 MMOL/L — SIGNIFICANT CHANGE UP (ref 98–107)
CO2 SERPL-SCNC: 26 MMOL/L — SIGNIFICANT CHANGE UP (ref 22–29)
CREAT SERPL-MCNC: 0.88 MG/DL — SIGNIFICANT CHANGE UP (ref 0.5–1.3)
GLUCOSE SERPL-MCNC: 106 MG/DL — SIGNIFICANT CHANGE UP (ref 70–115)
POTASSIUM SERPL-MCNC: 3.6 MMOL/L — SIGNIFICANT CHANGE UP (ref 3.5–5.3)
POTASSIUM SERPL-SCNC: 3.6 MMOL/L — SIGNIFICANT CHANGE UP (ref 3.5–5.3)
SODIUM SERPL-SCNC: 138 MMOL/L — SIGNIFICANT CHANGE UP (ref 135–145)

## 2019-04-25 PROCEDURE — 99233 SBSQ HOSP IP/OBS HIGH 50: CPT

## 2019-04-25 PROCEDURE — 99232 SBSQ HOSP IP/OBS MODERATE 35: CPT

## 2019-04-25 PROCEDURE — 99221 1ST HOSP IP/OBS SF/LOW 40: CPT

## 2019-04-25 RX ORDER — SACCHAROMYCES BOULARDII 250 MG
1 POWDER IN PACKET (EA) ORAL
Qty: 6 | Refills: 0 | OUTPATIENT
Start: 2019-04-25 | End: 2019-04-27

## 2019-04-25 RX ORDER — ASPIRIN/CALCIUM CARB/MAGNESIUM 324 MG
1 TABLET ORAL
Qty: 30 | Refills: 0 | OUTPATIENT
Start: 2019-04-25 | End: 2019-05-24

## 2019-04-25 RX ORDER — CLOPIDOGREL BISULFATE 75 MG/1
75 TABLET, FILM COATED ORAL DAILY
Qty: 0 | Refills: 0 | Status: DISCONTINUED | OUTPATIENT
Start: 2019-04-25 | End: 2019-04-26

## 2019-04-25 RX ORDER — SIMVASTATIN 20 MG/1
1 TABLET, FILM COATED ORAL
Qty: 30 | Refills: 0 | OUTPATIENT
Start: 2019-04-25 | End: 2019-05-24

## 2019-04-25 RX ORDER — AMLODIPINE BESYLATE 2.5 MG/1
1 TABLET ORAL
Qty: 30 | Refills: 0 | OUTPATIENT
Start: 2019-04-25 | End: 2019-05-24

## 2019-04-25 RX ORDER — CLOPIDOGREL BISULFATE 75 MG/1
1 TABLET, FILM COATED ORAL
Qty: 30 | Refills: 0 | OUTPATIENT
Start: 2019-04-25 | End: 2019-05-24

## 2019-04-25 RX ORDER — LISINOPRIL 2.5 MG/1
0 TABLET ORAL
Qty: 0 | Refills: 0 | COMMUNITY

## 2019-04-25 RX ORDER — LISINOPRIL 2.5 MG/1
1 TABLET ORAL
Qty: 30 | Refills: 0 | OUTPATIENT
Start: 2019-04-25 | End: 2019-05-24

## 2019-04-25 RX ORDER — AMLODIPINE BESYLATE 2.5 MG/1
5 TABLET ORAL DAILY
Qty: 0 | Refills: 0 | Status: DISCONTINUED | OUTPATIENT
Start: 2019-04-25 | End: 2019-04-26

## 2019-04-25 RX ORDER — METFORMIN HYDROCHLORIDE 850 MG/1
1 TABLET ORAL
Qty: 60 | Refills: 0 | OUTPATIENT
Start: 2019-04-25 | End: 2019-05-24

## 2019-04-25 RX ADMIN — Medication 1 DROP(S): at 21:35

## 2019-04-25 RX ADMIN — Medication 1 TABLET(S): at 17:07

## 2019-04-25 RX ADMIN — Medication 25 MILLIGRAM(S): at 06:04

## 2019-04-25 RX ADMIN — Medication 1 DROP(S): at 06:05

## 2019-04-25 RX ADMIN — Medication 1 SPRAY(S): at 17:07

## 2019-04-25 RX ADMIN — AMLODIPINE BESYLATE 5 MILLIGRAM(S): 2.5 TABLET ORAL at 14:01

## 2019-04-25 RX ADMIN — SIMVASTATIN 40 MILLIGRAM(S): 20 TABLET, FILM COATED ORAL at 21:35

## 2019-04-25 RX ADMIN — LISINOPRIL 20 MILLIGRAM(S): 2.5 TABLET ORAL at 06:04

## 2019-04-25 RX ADMIN — Medication 1 TABLET(S): at 06:03

## 2019-04-25 RX ADMIN — ENOXAPARIN SODIUM 40 MILLIGRAM(S): 100 INJECTION SUBCUTANEOUS at 21:36

## 2019-04-25 RX ADMIN — Medication 1 DROP(S): at 14:01

## 2019-04-25 RX ADMIN — CLOPIDOGREL BISULFATE 75 MILLIGRAM(S): 75 TABLET, FILM COATED ORAL at 14:01

## 2019-04-25 RX ADMIN — ALBUTEROL 2.5 MILLIGRAM(S): 90 AEROSOL, METERED ORAL at 09:21

## 2019-04-25 RX ADMIN — Medication 325 MILLIGRAM(S): at 10:31

## 2019-04-25 RX ADMIN — Medication 100 MILLIGRAM(S): at 06:03

## 2019-04-25 RX ADMIN — Medication 100 MILLIGRAM(S): at 21:34

## 2019-04-25 RX ADMIN — Medication 100 MILLIGRAM(S): at 14:01

## 2019-04-25 RX ADMIN — ALBUTEROL 2.5 MILLIGRAM(S): 90 AEROSOL, METERED ORAL at 02:16

## 2019-04-25 RX ADMIN — Medication 1 SPRAY(S): at 06:05

## 2019-04-25 NOTE — SPEECH LANGUAGE PATHOLOGY EVALUATION - COMMENTS
Mild deficits in receptive language skills marked by slow processing, increased difficulty comprehending information of increased length/complexity Mild expressive language deficits marked by slow formulation of ideas, reduced syntactic/semantic complexity, reduced initiation To assess Ongoing assessment - Suspect at least mild cognitive deficits Mild-moderate dysarthria of speech impacting articulatory subsystem. Overall intelligibility is fair-good

## 2019-04-25 NOTE — CONSULT NOTE ADULT - SUBJECTIVE AND OBJECTIVE BOX
HPI:  · HPI Objective Statement: Pt is a 39 y/o M, with PMHx of HTN, presenting to the ED with c/o suspected allergic reaction, onset today. Pt states he was recently placed on a zpak for treatment of congestion and URI like sxs. Took first dose yesterday and developed nausea and vomiting. Woke up this morning with right sided facial numbness and feeling like it is swollen and dizziness. Denies HA, CP, and SOB. NO hx of the same. Pt's BP elevated in the ED.  · Presenting Symptoms: right sided facial numbness/swelling, dizziness  · Negative Findings: no shortness of breath  · Location: face  · Laterality: right  · Duration: yesterday  · Relieving Factors: none    Above ED HPI reviewed and noted: patient and uncle at bedside, confirm above. patient reports having symptoms of dizziness, cough, fever, and loss of appetite for 1 week. One the day of arrival he developed acute onset vomiting with worsening dizziness causing him to seek medical attention. patient denies head trauma or fall. (22 Apr 2019 00:29)  As part of routine lab testing A1C found to be 6.6. No prior h/o diabetes      PAST MEDICAL & SURGICAL HISTORY:  Recurrent sinusitis  HTN (hypertension)  No significant past surgical history      FAMILY HISTORY:  No pertinent family history in first degree relatives      SOCIAL HISTORY:    REVIEW OF SYSTEMS: no current compliants      MEDICATIONS  (STANDING):  ALBUTerol    0.083% 2.5 milliGRAM(s) Nebulizer every 6 hours  ALBUTerol    90 MICROgram(s) HFA Inhaler 1 Puff(s) Inhalation every 4 hours  amoxicillin  875 milliGRAM(s)/clavulanate 1 Tablet(s) Oral two times a day  aspirin enteric coated 325 milliGRAM(s) Oral daily  benzonatate 100 milliGRAM(s) Oral three times a day  dexamethasone/neomycin/polymyxin Suspension 1 Drop(s) Right EYE three times a day  enoxaparin Injectable 40 milliGRAM(s) SubCutaneous every 24 hours  fluticasone propionate 50 MICROgram(s)/spray Nasal Spray 1 Spray(s) Both Nostrils two times a day  hydrochlorothiazide 25 milliGRAM(s) Oral daily  lisinopril 20 milliGRAM(s) Oral daily  simvastatin 40 milliGRAM(s) Oral at bedtime  sodium chloride 0.9%. 1000 milliLiter(s) (60 mL/Hr) IV Continuous <Continuous>  tiotropium 18 MICROgram(s) Capsule 1 Capsule(s) Inhalation daily    MEDICATIONS  (PRN):  acetaminophen   Tablet .. 650 milliGRAM(s) Oral every 6 hours PRN Temp greater or equal to 38C (100.4F), Mild Pain (1 - 3)  guaiFENesin    Syrup 200 milliGRAM(s) Oral every 6 hours PRN Cough      Allergies    No Known Allergies    Intolerances          PHYSICAL EXAM:    Vital Signs Last 24 Hrs  T(C): 37.1 (25 Apr 2019 06:01), Max: 37.2 (24 Apr 2019 16:00)  T(F): 98.7 (25 Apr 2019 06:01), Max: 99 (24 Apr 2019 16:00)  HR: 80 (25 Apr 2019 06:01) (70 - 91)  BP: 137/89 (25 Apr 2019 06:01) (131/84 - 158/119)  BP(mean): --  RR: 16 (25 Apr 2019 06:01) (15 - 21)  SpO2: 98% (25 Apr 2019 06:01) (96% - 100%)    General appearance: Well developed, well nourished. Generalized obesity    Eyes: Pupils equal. EOM full. No exophthalmos.    Neck: Trachea midline. No thyroid enlargement.    Lungs: Normal respiratory excursion. Lungs clear.    CV: Regular cardiac rhythm. No murmur.     Abdomen: Soft, non tender, no organomegaly or mass.      Psych: Normal affect, good judgement.            LABS:                        13.7   10.9  )-----------( 256      ( 24 Apr 2019 05:06 )             42.3     04-24    139  |  100  |  23.0<H>  ----------------------------<  102  3.6   |  26.0  |  0.89    Ca    8.9      24 Apr 2019 05:06  Phos  3.4     04-24  Mg     2.3     04-24

## 2019-04-25 NOTE — DISCHARGE NOTE PROVIDER - PROVIDER TOKENS
PROVIDER:[TOKEN:[[id number]],FOLLOWUP:[1 week]],PROVIDER:[TOKEN:[[id number]],FOLLOWUP:[1 week]] PROVIDER:[TOKEN:[[id number]],FOLLOWUP:[1 week]],PROVIDER:[TOKEN:[[id number]],FOLLOWUP:[1 week]],PROVIDER:[TOKEN:[[id number]],FOLLOWUP:[1 month]]

## 2019-04-25 NOTE — DISCHARGE NOTE PROVIDER - CARE PROVIDERS DIRECT ADDRESSES
,DirectAddress_Unknown,[email or web address] ,DirectAddress_Unknown,[email or web address],DirectAddress_Unknown

## 2019-04-25 NOTE — CONSULT NOTE ADULT - ASSESSMENT
DM type 2, controlled, newly discovered.  Diabetes education requested  Reasonable to start metformin as outpatient.

## 2019-04-25 NOTE — DISCHARGE NOTE PROVIDER - NSDCFUADDINST_GEN_ALL_CORE_FT
Continue medications as instructed, follow up with PMD in 1 week.  Follow up with neurology and cardiology in 1 week.

## 2019-04-25 NOTE — DISCHARGE NOTE PROVIDER - NSDCCPCAREPLAN_GEN_ALL_CORE_FT
PRINCIPAL DISCHARGE DIAGNOSIS  Diagnosis: HTN (hypertension)  Assessment and Plan of Treatment:       SECONDARY DISCHARGE DIAGNOSES  Diagnosis: Paresthesia  Assessment and Plan of Treatment:     Diagnosis: Vertigo  Assessment and Plan of Treatment:

## 2019-04-25 NOTE — SPEECH LANGUAGE PATHOLOGY EVALUATION - SLP GENERAL OBSERVATIONS
Pt received A&A in bed, 0x3, +dysarthria, suspect reduced cognition, inattention to right, aunt at bedside

## 2019-04-25 NOTE — PROGRESS NOTE ADULT - ASSESSMENT
38 yr old male with hypertension admitted with complaints of facial numbness and dizziness. He was recently started on a course of Azithromycin for URI. On arrival, noted to have elevated BP systolic 180, CT head was performed which was negative for acute stroke, there was evidence of paranasal sinus disease and left mastoid opacification. He was started on Levofloxacin for URI. 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 Neurology consulted. Advised MRA head and neck, which showed no abnormalities. LUZ MARINA was performed, showed no abnormalities. Speech and swallow was consulted, initially advised soft with nectar thick and later advanced to soft with thin liquids. PMR was consulted.

## 2019-04-25 NOTE — CONSULT NOTE ADULT - REASON FOR ADMISSION
r/o allergic reaction; uncontrol HTN; suspected CVA
stroke
r/o allergic reaction; uncontrol HTN; suspected CVA

## 2019-04-25 NOTE — PROGRESS NOTE ADULT - PROBLEM SELECTOR PLAN 1
No cardioembolic cause of CVA.   aspirin, plavix and statins  low risk of afib. Normal LUZ MARINA. normal LA size.   outpaitent MCOT monitor. .

## 2019-04-25 NOTE — SPEECH LANGUAGE PATHOLOGY EVALUATION - SLP DIAGNOSIS
Mild receptive/expressive language deficits, marked by slow processing/formulation of information, increased difficulty as length/complexity increases. Mild-moderate dysarthria of speech characterized by reduced articulatory precision, slow speech rate. Overall intelligibility is fair-good in both shared and unshared contexts. Suspect at least mild deficits in cognition; assessment to be ongoing.

## 2019-04-25 NOTE — DISCHARGE NOTE PROVIDER - NSDCQMMRS_NEU_ALL_CORE
2 - Slight disability. Able to lookafter own affairs without assistance, but unable to carry out all previous activities

## 2019-04-25 NOTE — PROGRESS NOTE ADULT - PROBLEM SELECTOR PLAN 1
Continue aspirin, plavix, statin. LUZ MARINA and MRA results noted. Cardiology follow up. Needs PT follow up to determine if safe discharge home.

## 2019-04-25 NOTE — DISCHARGE NOTE PROVIDER - CARE PROVIDER_API CALL
Luis Galindo)  Neurology  [address] [place] [address]  Phone: [phone]  Fax: [phone]  Follow Up Time: 1 week    Owen Palmer)  Cardiology; Internal Medicine  [address] [place] [address], [address] [id number]  Phone: [phone]  Fax: [phone]  Follow Up Time: 1 week Luis Galindo)  Neurology  [address] [place] [address]  Phone: [phone]  Fax: [phone]  Follow Up Time: 1 week    Owen Palmer)  Cardiology; Internal Medicine  [address] [facility], [facility] [address]  [place], [place] [id number]  Phone: [phone]  Fax: [phone]  Follow Up Time: 1 week    Khadar Cervantes)  Otolaryngology  [address] [place] [address], [place] [address]  Phone: [phone]  Fax: [phone]  Follow Up Time: 1 month

## 2019-04-25 NOTE — DISCHARGE NOTE PROVIDER - HOSPITAL COURSE
38 yr old male with hypertension admitted with complaints of facial numbness and dizziness. He was recently started on a course of Azithromycin for URI. On arrival, noted to have elevated BP systolic 180, CT head was performed which was negative for acute stroke, there was evidence of paranasal sinus disease and left mastoid opacification. He was started on Levofloxacin for URI. 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 Neurology consulted. Advised MRA head and neck, which showed no abnormalities. LUZ MARINA was performed, showed no abnormalities. Speech and swallow was consulted, initially advised soft with nectar thick and later advanced to soft with thin liquids. PMR was consulted. PT and OT eval was requested.            Spent > 35 mins in discharge plan and documentation. 38 yr old male with hypertension admitted with complaints of facial numbness and dizziness. He was recently started on a course of Azithromycin for URI. On arrival, noted to have elevated BP systolic 180, CT head was performed which was negative for acute stroke, there was evidence of paranasal sinus disease and left mastoid opacification. He was started on Levofloxacin for URI. 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 Neurology consulted. Advised MRA head and neck, which showed no abnormalities. LUZ MARINA was performed, showed no abnormalities. Speech and swallow was consulted, initially advised soft with nectar thick and later advanced to soft with thin liquids. PMR was consulted. PT and OT eval was requested. Advised outpatient/home PT. He is stable for discharge home.            Spent > 35 mins in discharge plan and documentation.

## 2019-04-26 VITALS
TEMPERATURE: 98 F | RESPIRATION RATE: 20 BRPM | SYSTOLIC BLOOD PRESSURE: 138 MMHG | DIASTOLIC BLOOD PRESSURE: 95 MMHG | HEART RATE: 95 BPM | OXYGEN SATURATION: 95 %

## 2019-04-26 PROCEDURE — 70551 MRI BRAIN STEM W/O DYE: CPT

## 2019-04-26 PROCEDURE — 87798 DETECT AGENT NOS DNA AMP: CPT

## 2019-04-26 PROCEDURE — 86618 LYME DISEASE ANTIBODY: CPT

## 2019-04-26 PROCEDURE — 92610 EVALUATE SWALLOWING FUNCTION: CPT

## 2019-04-26 PROCEDURE — 86036 ANCA SCREEN EACH ANTIBODY: CPT

## 2019-04-26 PROCEDURE — 71045 X-RAY EXAM CHEST 1 VIEW: CPT

## 2019-04-26 PROCEDURE — 93325 DOPPLER ECHO COLOR FLOW MAPG: CPT

## 2019-04-26 PROCEDURE — 97163 PT EVAL HIGH COMPLEX 45 MIN: CPT

## 2019-04-26 PROCEDURE — 86038 ANTINUCLEAR ANTIBODIES: CPT

## 2019-04-26 PROCEDURE — 70552 MRI BRAIN STEM W/DYE: CPT

## 2019-04-26 PROCEDURE — 80061 LIPID PANEL: CPT

## 2019-04-26 PROCEDURE — 93320 DOPPLER ECHO COMPLETE: CPT

## 2019-04-26 PROCEDURE — 36415 COLL VENOUS BLD VENIPUNCTURE: CPT

## 2019-04-26 PROCEDURE — 70548 MR ANGIOGRAPHY NECK W/DYE: CPT

## 2019-04-26 PROCEDURE — 83735 ASSAY OF MAGNESIUM: CPT

## 2019-04-26 PROCEDURE — 70450 CT HEAD/BRAIN W/O DYE: CPT

## 2019-04-26 PROCEDURE — 93005 ELECTROCARDIOGRAM TRACING: CPT

## 2019-04-26 PROCEDURE — 83036 HEMOGLOBIN GLYCOSYLATED A1C: CPT

## 2019-04-26 PROCEDURE — 99285 EMERGENCY DEPT VISIT HI MDM: CPT | Mod: 25

## 2019-04-26 PROCEDURE — 86235 NUCLEAR ANTIGEN ANTIBODY: CPT

## 2019-04-26 PROCEDURE — 80048 BASIC METABOLIC PNL TOTAL CA: CPT

## 2019-04-26 PROCEDURE — 87581 M.PNEUMON DNA AMP PROBE: CPT

## 2019-04-26 PROCEDURE — 94640 AIRWAY INHALATION TREATMENT: CPT

## 2019-04-26 PROCEDURE — 84100 ASSAY OF PHOSPHORUS: CPT

## 2019-04-26 PROCEDURE — 97110 THERAPEUTIC EXERCISES: CPT

## 2019-04-26 PROCEDURE — 96375 TX/PRO/DX INJ NEW DRUG ADDON: CPT

## 2019-04-26 PROCEDURE — 84484 ASSAY OF TROPONIN QUANT: CPT

## 2019-04-26 PROCEDURE — 99233 SBSQ HOSP IP/OBS HIGH 50: CPT

## 2019-04-26 PROCEDURE — 82164 ANGIOTENSIN I ENZYME TEST: CPT

## 2019-04-26 PROCEDURE — 85027 COMPLETE CBC AUTOMATED: CPT

## 2019-04-26 PROCEDURE — 86780 TREPONEMA PALLIDUM: CPT

## 2019-04-26 PROCEDURE — 87486 CHLMYD PNEUM DNA AMP PROBE: CPT

## 2019-04-26 PROCEDURE — 87633 RESP VIRUS 12-25 TARGETS: CPT

## 2019-04-26 PROCEDURE — 97116 GAIT TRAINING THERAPY: CPT

## 2019-04-26 PROCEDURE — 85730 THROMBOPLASTIN TIME PARTIAL: CPT

## 2019-04-26 PROCEDURE — 70544 MR ANGIOGRAPHY HEAD W/O DYE: CPT

## 2019-04-26 PROCEDURE — 99239 HOSP IP/OBS DSCHRG MGMT >30: CPT

## 2019-04-26 PROCEDURE — 96374 THER/PROPH/DIAG INJ IV PUSH: CPT

## 2019-04-26 PROCEDURE — 85610 PROTHROMBIN TIME: CPT

## 2019-04-26 PROCEDURE — 97167 OT EVAL HIGH COMPLEX 60 MIN: CPT

## 2019-04-26 PROCEDURE — 93880 EXTRACRANIAL BILAT STUDY: CPT

## 2019-04-26 PROCEDURE — 93312 ECHO TRANSESOPHAGEAL: CPT

## 2019-04-26 RX ADMIN — Medication 25 MILLIGRAM(S): at 05:28

## 2019-04-26 RX ADMIN — LISINOPRIL 20 MILLIGRAM(S): 2.5 TABLET ORAL at 05:29

## 2019-04-26 RX ADMIN — ALBUTEROL 2.5 MILLIGRAM(S): 90 AEROSOL, METERED ORAL at 03:25

## 2019-04-26 RX ADMIN — ALBUTEROL 2.5 MILLIGRAM(S): 90 AEROSOL, METERED ORAL at 08:41

## 2019-04-26 RX ADMIN — AMLODIPINE BESYLATE 5 MILLIGRAM(S): 2.5 TABLET ORAL at 05:32

## 2019-04-26 RX ADMIN — Medication 1 DROP(S): at 05:28

## 2019-04-26 RX ADMIN — Medication 100 MILLIGRAM(S): at 05:29

## 2019-04-26 RX ADMIN — CLOPIDOGREL BISULFATE 75 MILLIGRAM(S): 75 TABLET, FILM COATED ORAL at 13:01

## 2019-04-26 RX ADMIN — Medication 1 TABLET(S): at 05:29

## 2019-04-26 RX ADMIN — Medication 325 MILLIGRAM(S): at 13:01

## 2019-04-26 RX ADMIN — Medication 1 SPRAY(S): at 05:28

## 2019-04-26 NOTE — OCCUPATIONAL THERAPY INITIAL EVALUATION ADULT - ADDITIONAL COMMENTS
Pt lives in house with 4 DEANNA and 5 steps inside up to bedroom and bathroom. Bathroom has shower stall with doors. Pt does not own any DME. Pt is right handed. Pt does not drive. Pt's parents are both retired and able to assist upon discharge; they are currently on vacation however will be returning soon.

## 2019-04-26 NOTE — OCCUPATIONAL THERAPY INITIAL EVALUATION ADULT - ASSISTIVE DEVICE FOR TOILET TRANSFER, REHAB EVAL
elevated toilet seat with left grab bar; recommend use of commode over standard toilet at home to increase surface height and provide armrests/rolling walker

## 2019-04-26 NOTE — OCCUPATIONAL THERAPY INITIAL EVALUATION ADULT - PLANNED THERAPY INTERVENTIONS, OT EVAL
ADL retraining/bed mobility training/strengthening/transfer training/balance training/motor coordination training/toilet

## 2019-04-26 NOTE — PROGRESS NOTE ADULT - PROBLEM SELECTOR PLAN 2
Out of window for permissive HTN. Continue HCTZ and lisinopril. Monitor BP, now controlled.
Continue HCTZ and lisinopril, norvasc.
Continue HCTZ and lisinopril. Monitor BP. Add Norvasc.
Out of window for permissive HTN. Continue HCTZ and lisinopril. Monitor BP, remains controlled.
ct current meds,.
Continue HCTZ and lisinopril. Monitor BP.

## 2019-04-26 NOTE — PROGRESS NOTE ADULT - SUBJECTIVE AND OBJECTIVE BOX
Interval.Overnight:  No acute events. Chart reviewed. Pt seen/examined by Attending and PA. No acute distress noted, sitting up in chair.       Vital Signs Last 24 Hrs  T(C): 37 (23 Apr 2019 08:07), Max: 37.1 (22 Apr 2019 20:27)  T(F): 98.6 (23 Apr 2019 08:07), Max: 98.8 (22 Apr 2019 20:27)  HR: 93 (23 Apr 2019 12:08) (65 - 93)  BP: 125/85 (23 Apr 2019 12:08) (120/87 - 167/114)  BP(mean): 128 (23 Apr 2019 06:05) (128 - 128)  RR: 17 (23 Apr 2019 12:08) (15 - 18)  SpO2: 95% (23 Apr 2019 12:08) (95% - 100%)I&O's Summary  CAPILLARY BLOOD GLUCOSE    PHYSICAL EXAM:  GENERAL: NAD, sitting up in chair  HEENT: NC/AT  NECK: Supple, No JVD   CHEST/LUNG: diminishedt  HEART: S1S2 Normal intensity, no murmurs, gallops or rubs noted  ABDOMEN: Soft, BS Normoactive, NT, ND, no HSM noted  EXTREMITIES:  no sig edema  SKIN: No rashes or lesions noted  NEURO: A&Ox3, mildly slurred speech, right facial droop  PSYCH: normal mood and affect; insight/judgement appropriate    LABS:                        14.3   11.3  )-----------( 269      ( 23 Apr 2019 06:48 )             43.6     04-23    136  |  96<L>  |  22.0<H>  ----------------------------<  125<H>  4.1   |  26.0  |  1.05    Ca    9.2      23 Apr 2019 06:48  Phos  3.2     04-23  Mg     2.4     04-23      PT/INR - ( 21 Apr 2019 21:02 )   PT: 13.8 sec;   INR: 1.19 ratio         PTT - ( 21 Apr 2019 21:02 )  PTT:33.8 sec    RADIOLOGY & ADDITIONAL TESTS:    MRI 4/22/19    IMPRESSION:    Acutenonhemorrhagic infarct in the right middle cerebellar peduncle.    Scattered mucosal disease of the paranasal sinuses and polypoid retention   cyst in the maxillary sinuses bilaterally.    Inflammatory changes left mastoid.    US Carotid 4/22/19s   IMPRESSION:       1.  Right carotid system:  No hemodynamically significant stenosis is   found.          2.  Left carotid system:  No hemodynamically significant stenosis is   found.                    MEDICATIONS:  MEDICATIONS  (STANDING):  ALBUTerol    90 MICROgram(s) HFA Inhaler 1 Puff(s) Inhalation every 4 hours  ALBUTerol/ipratropium for Nebulization 3 milliLiter(s) Nebulizer every 4 hours  amoxicillin  875 milliGRAM(s)/clavulanate 1 Tablet(s) Oral two times a day  aspirin enteric coated 325 milliGRAM(s) Oral daily  enoxaparin Injectable 40 milliGRAM(s) SubCutaneous every 24 hours  hydrochlorothiazide 25 milliGRAM(s) Oral daily  lisinopril 20 milliGRAM(s) Oral daily  meclizine 50 milliGRAM(s) Oral two times a day  simvastatin 40 milliGRAM(s) Oral at bedtime  tiotropium 18 MICROgram(s) Capsule 1 Capsule(s) Inhalation daily    MEDICATIONS  (PRN):  acetaminophen   Tablet .. 650 milliGRAM(s) Oral every 6 hours PRN Temp greater or equal to 38C (100.4F), Mild Pain (1 - 3)  diphenhydrAMINE   Injectable 50 milliGRAM(s) IV Push every 4 hours PRN Rash and/or Itching  guaiFENesin    Syrup 200 milliGRAM(s) Oral once PRN Cough
INTERVAL HISTORY:  no interval changes      VITAL SIGNS:  Vital Signs Last 24 Hrs  T(C): 37 (23 Apr 2019 08:07), Max: 37.1 (22 Apr 2019 11:44)  T(F): 98.6 (23 Apr 2019 08:07), Max: 98.8 (22 Apr 2019 11:44)  HR: 75 (23 Apr 2019 08:00) (65 - 89)  BP: 141/90 (23 Apr 2019 08:00) (120/87 - 167/114)  BP(mean): 128 (23 Apr 2019 06:05) (128 - 128)  RR: 18 (23 Apr 2019 08:00) (15 - 18)  SpO2: 98% (23 Apr 2019 08:00) (96% - 100%)    PHYSICAL EXAMINATION:    Mentation:  nl  Language/Speech: dysarthria  CN: right peripheral patter facial droop.  right hearing loss  Visual Fields:full  Motor: no weakness  Rast pointing on right F to N  Sensory: symm  DTR:  Babinski:      MEDS:  MEDICATIONS  (STANDING):  ALBUTerol    90 MICROgram(s) HFA Inhaler 1 Puff(s) Inhalation every 4 hours  ALBUTerol/ipratropium for Nebulization 3 milliLiter(s) Nebulizer every 4 hours  amoxicillin  875 milliGRAM(s)/clavulanate 1 Tablet(s) Oral two times a day  aspirin enteric coated 325 milliGRAM(s) Oral daily  enoxaparin Injectable 40 milliGRAM(s) SubCutaneous every 24 hours  hydrochlorothiazide 25 milliGRAM(s) Oral daily  lisinopril 20 milliGRAM(s) Oral daily  meclizine 50 milliGRAM(s) Oral two times a day  simvastatin 40 milliGRAM(s) Oral at bedtime  tiotropium 18 MICROgram(s) Capsule 1 Capsule(s) Inhalation daily    MEDICATIONS  (PRN):  acetaminophen   Tablet .. 650 milliGRAM(s) Oral every 6 hours PRN Temp greater or equal to 38C (100.4F), Mild Pain (1 - 3)  diphenhydrAMINE   Injectable 50 milliGRAM(s) IV Push every 4 hours PRN Rash and/or Itching  guaiFENesin    Syrup 200 milliGRAM(s) Oral once PRN Cough      LABS:                          14.3   11.3  )-----------( 269      ( 23 Apr 2019 06:48 )             43.6     04-23    136  |  96<L>  |  22.0<H>  ----------------------------<  125<H>  4.1   |  26.0  |  1.05    Ca    9.2      23 Apr 2019 06:48  Phos  3.2     04-23  Mg     2.4     04-23            RADIOLOGY & ADDITIONAL STUDIES:      IMPRESSION & PLAN:    Cerebellar infarct on MRI  Right CN VII/ VIII involvment  ( not brainstem clinically)  Await MRI with tulio  (tonight) and LUZ MARINA tomorrow  Labs pending -see yesterday' note
INTERVAL HISTORY:  no interval changes      VITAL SIGNS:  Vital Signs Last 24 Hrs  T(C): 37.1 (24 Apr 2019 08:06), Max: 37.4 (23 Apr 2019 14:28)  T(F): 98.7 (24 Apr 2019 08:06), Max: 99.3 (23 Apr 2019 14:28)  HR: 82 (24 Apr 2019 08:00) (58 - 93)  BP: 132/79 (24 Apr 2019 08:00) (123/92 - 138/86)  BP(mean): 104 (24 Apr 2019 05:14) (100 - 104)  RR: 14 (24 Apr 2019 08:00) (13 - 17)  SpO2: 99% (24 Apr 2019 08:00) (95% - 100%)    PHYSICAL EXAMINATION:    Mentation:  nl  Language/Speech:  CN: right facial , deaf  Visual Fields: full  Motor:  Sensory:  DTR:  Babinski:      MEDS:  MEDICATIONS  (STANDING):  ALBUTerol    90 MICROgram(s) HFA Inhaler 1 Puff(s) Inhalation every 4 hours  amoxicillin  875 milliGRAM(s)/clavulanate 1 Tablet(s) Oral two times a day  aspirin enteric coated 325 milliGRAM(s) Oral daily  enoxaparin Injectable 40 milliGRAM(s) SubCutaneous every 24 hours  hydrochlorothiazide 25 milliGRAM(s) Oral daily  lisinopril 20 milliGRAM(s) Oral daily  meclizine 50 milliGRAM(s) Oral two times a day  simvastatin 40 milliGRAM(s) Oral at bedtime  sodium chloride 0.9%. 1000 milliLiter(s) (60 mL/Hr) IV Continuous <Continuous>  tiotropium 18 MICROgram(s) Capsule 1 Capsule(s) Inhalation daily    MEDICATIONS  (PRN):  acetaminophen   Tablet .. 650 milliGRAM(s) Oral every 6 hours PRN Temp greater or equal to 38C (100.4F), Mild Pain (1 - 3)  diphenhydrAMINE   Injectable 50 milliGRAM(s) IV Push every 4 hours PRN Rash and/or Itching      LABS:                          13.7   10.9  )-----------( 256      ( 24 Apr 2019 05:06 )             42.3     04-24    139  |  100  |  23.0<H>  ----------------------------<  102  3.6   |  26.0  |  0.89    Ca    8.9      24 Apr 2019 05:06  Phos  3.4     04-24  Mg     2.3     04-24            RADIOLOGY & ADDITIONAL STUDIES:      IMPRESSION & PLAN:    Awaiting MRIs, labs and  LUZ MARINA  Further recommendations to follow
INTERVAL HISTORY:  no interval changes .       VITAL SIGNS:  Vital Signs Last 24 Hrs  T(C): 36.8 (25 Apr 2019 10:02), Max: 37.2 (24 Apr 2019 16:00)  T(F): 98.3 (25 Apr 2019 10:02), Max: 99 (24 Apr 2019 16:00)  HR: 80 (25 Apr 2019 10:40) (70 - 91)  BP: 162/90 (25 Apr 2019 10:40) (131/84 - 184/83)  BP(mean): --  RR: 16 (25 Apr 2019 10:40) (15 - 21)  SpO2: 100% (25 Apr 2019 10:40) (96% - 100%)    PHYSICAL EXAMINATION:    Mentation:  nl  Language/Speech: dysarthira  CN: right deaf, facial weakness  Visual Fields:  Motor: right dysmetria  Sensory:  DTR:  Babinski:      MEDS:  MEDICATIONS  (STANDING):  ALBUTerol    0.083% 2.5 milliGRAM(s) Nebulizer every 6 hours  ALBUTerol    90 MICROgram(s) HFA Inhaler 1 Puff(s) Inhalation every 4 hours  amoxicillin  875 milliGRAM(s)/clavulanate 1 Tablet(s) Oral two times a day  aspirin enteric coated 325 milliGRAM(s) Oral daily  benzonatate 100 milliGRAM(s) Oral three times a day  dexamethasone/neomycin/polymyxin Suspension 1 Drop(s) Right EYE three times a day  enoxaparin Injectable 40 milliGRAM(s) SubCutaneous every 24 hours  fluticasone propionate 50 MICROgram(s)/spray Nasal Spray 1 Spray(s) Both Nostrils two times a day  hydrochlorothiazide 25 milliGRAM(s) Oral daily  lisinopril 20 milliGRAM(s) Oral daily  simvastatin 40 milliGRAM(s) Oral at bedtime  tiotropium 18 MICROgram(s) Capsule 1 Capsule(s) Inhalation daily    MEDICATIONS  (PRN):  acetaminophen   Tablet .. 650 milliGRAM(s) Oral every 6 hours PRN Temp greater or equal to 38C (100.4F), Mild Pain (1 - 3)  guaiFENesin    Syrup 200 milliGRAM(s) Oral every 6 hours PRN Cough      LABS:                          13.7   10.9  )-----------( 256      ( 24 Apr 2019 05:06 )             42.3     04-24    139  |  100  |  23.0<H>  ----------------------------<  102  3.6   |  26.0  |  0.89    Ca    8.9      24 Apr 2019 05:06  Phos  3.4     04-24  Mg     2.3     04-24            RADIOLOGY & ADDITIONAL STUDIES:      IMPRESSION & PLAN:    No clinical changes   .Labs are all neg  MRI shows infarct in AICA territory. This artery also supples the seventh and eight nerves which would explain the constellation of symptoms without direct brainstem involvment    REC;  Antiplatelet therapy as prescribed.  Cerebrovascular risk factor assessment and management  Will not actively follow.   Neurologically cleared for discharge/disposition.-Rehab  Please recontact as needed.
INTERVAL HPI/OVERNIGHT EVENTS:    CC: acute cerebellar stroke, diabetes mellitus, hypertension    No overnight events, denies complaints.    Vital Signs Last 24 Hrs  T(C): 36.8 (25 Apr 2019 10:02), Max: 37.2 (24 Apr 2019 16:00)  T(F): 98.3 (25 Apr 2019 10:02), Max: 99 (24 Apr 2019 16:00)  HR: 80 (25 Apr 2019 10:40) (70 - 91)  BP: 162/90 (25 Apr 2019 10:40) (137/89 - 184/83)  BP(mean): --  RR: 16 (25 Apr 2019 10:40) (15 - 21)  SpO2: 100% (25 Apr 2019 10:40) (96% - 100%)    PHYSICAL EXAM:    GENERAL: Npt in distress, alert, right facial weakness and numbness over right side of the face.  CHEST/LUNG: b/l air entry  HEART: Regular  ABDOMEN: Soft, BS+  EXTREMITIES:  no edema, tenderness.    MEDICATIONS  (STANDING):  ALBUTerol    0.083% 2.5 milliGRAM(s) Nebulizer every 6 hours  ALBUTerol    90 MICROgram(s) HFA Inhaler 1 Puff(s) Inhalation every 4 hours  amLODIPine   Tablet 5 milliGRAM(s) Oral daily  amoxicillin  875 milliGRAM(s)/clavulanate 1 Tablet(s) Oral two times a day  aspirin enteric coated 325 milliGRAM(s) Oral daily  benzonatate 100 milliGRAM(s) Oral three times a day  clopidogrel Tablet 75 milliGRAM(s) Oral daily  dexamethasone/neomycin/polymyxin Suspension 1 Drop(s) Right EYE three times a day  enoxaparin Injectable 40 milliGRAM(s) SubCutaneous every 24 hours  fluticasone propionate 50 MICROgram(s)/spray Nasal Spray 1 Spray(s) Both Nostrils two times a day  hydrochlorothiazide 25 milliGRAM(s) Oral daily  lisinopril 20 milliGRAM(s) Oral daily  simvastatin 40 milliGRAM(s) Oral at bedtime  tiotropium 18 MICROgram(s) Capsule 1 Capsule(s) Inhalation daily    MEDICATIONS  (PRN):  acetaminophen   Tablet .. 650 milliGRAM(s) Oral every 6 hours PRN Temp greater or equal to 38C (100.4F), Mild Pain (1 - 3)  guaiFENesin    Syrup 200 milliGRAM(s) Oral every 6 hours PRN Cough      Allergies    No Known Allergies    Intolerances          LABS:                          13.7   10.9  )-----------( 256      ( 24 Apr 2019 05:06 )             42.3     04-25    138  |  98  |  20.0  ----------------------------<  106  3.6   |  26.0  |  0.88    Ca    9.4      25 Apr 2019 12:03  Phos  3.4     04-24  Mg     2.3     04-24            RADIOLOGY & ADDITIONAL TESTS:
INTERVAL HPI/OVERNIGHT EVENTS:    CC: acute stroke, sinusitis, hypertension, diabetes mellitus    No overnight events, cough improved. right facial numbness improving.    Vital Signs Last 24 Hrs  T(C): 37.1 (26 Apr 2019 10:07), Max: 37.1 (26 Apr 2019 10:07)  T(F): 98.7 (26 Apr 2019 10:07), Max: 98.7 (26 Apr 2019 10:07)  HR: 87 (26 Apr 2019 10:07) (65 - 88)  BP: 122/81 (26 Apr 2019 10:07) (122/81 - 167/99)  BP(mean): --  RR: 16 (26 Apr 2019 10:07) (16 - 16)  SpO2: 98% (26 Apr 2019 10:07) (97% - 100%)    PHYSICAL EXAM:    GENERAL: Not in distress, alert  CHEST/LUNG: b/l air entry, clear  HEART: Regular  ABDOMEN: Soft, BS+  EXTREMITIES:  No edema, tenderness.    MEDICATIONS  (STANDING):  ALBUTerol    0.083% 2.5 milliGRAM(s) Nebulizer every 6 hours  ALBUTerol    90 MICROgram(s) HFA Inhaler 1 Puff(s) Inhalation every 4 hours  amLODIPine   Tablet 5 milliGRAM(s) Oral daily  amoxicillin  875 milliGRAM(s)/clavulanate 1 Tablet(s) Oral two times a day  aspirin enteric coated 325 milliGRAM(s) Oral daily  benzonatate 100 milliGRAM(s) Oral three times a day  clopidogrel Tablet 75 milliGRAM(s) Oral daily  dexamethasone/neomycin/polymyxin Suspension 1 Drop(s) Right EYE three times a day  enoxaparin Injectable 40 milliGRAM(s) SubCutaneous every 24 hours  fluticasone propionate 50 MICROgram(s)/spray Nasal Spray 1 Spray(s) Both Nostrils two times a day  hydrochlorothiazide 25 milliGRAM(s) Oral daily  lisinopril 20 milliGRAM(s) Oral daily  simvastatin 40 milliGRAM(s) Oral at bedtime  tiotropium 18 MICROgram(s) Capsule 1 Capsule(s) Inhalation daily    MEDICATIONS  (PRN):  acetaminophen   Tablet .. 650 milliGRAM(s) Oral every 6 hours PRN Temp greater or equal to 38C (100.4F), Mild Pain (1 - 3)  guaiFENesin    Syrup 200 milliGRAM(s) Oral every 6 hours PRN Cough      Allergies    No Known Allergies    Intolerances          LABS:      04-25    138  |  98  |  20.0  ----------------------------<  106  3.6   |  26.0  |  0.88    Ca    9.4      25 Apr 2019 12:03            RADIOLOGY & ADDITIONAL TESTS:
Interval/Overnight: No acute events, chart reviewed. Pt seen/examined by Attending and PA. Neuro deficits about the same. Pt w bronchial cough, nasal congestion and r eye drainage.     Vital Signs Last 24 Hrs  T(C): 37.1 (24 Apr 2019 08:06), Max: 37.4 (23 Apr 2019 14:28)  T(F): 98.7 (24 Apr 2019 08:06), Max: 99.3 (23 Apr 2019 14:28)  HR: 85 (24 Apr 2019 12:00) (58 - 85)  BP: 131/84 (24 Apr 2019 12:00) (123/92 - 138/86)  BP(mean): 104 (24 Apr 2019 05:14) (100 - 104)  RR: 18 (24 Apr 2019 12:00) (13 - 18)  SpO2: 100% (24 Apr 2019 12:00) (96% - 100%)I&O's Summary    23 Apr 2019 07:01  -  24 Apr 2019 07:00  --------------------------------------------------------  IN: 1380 mL / OUT: 1100 mL / NET: 280 mL    24 Apr 2019 07:01  -  24 Apr 2019 12:43  --------------------------------------------------------  IN: 240 mL / OUT: 500 mL / NET: -260 mL    CAPILLARY BLOOD GLUCOSE    PHYSICAL EXAM:  GENERAL: NAD, sitting up in bed  HEENT: NC/AT, right eye noted w purulent crusting/drainage  NECK: Supple, No JVD   CHEST/LUNG: bronchial cough, no rales/rhonchi or wheezing   HEART: S1S2 Normal intensity, no murmurs, gallops or rubs noted  ABDOMEN: Soft, BS Normoactive, NT, ND, no HSM noted  EXTREMITIES:  no sig edema  SKIN: No rashes or lesions noted  NEURO: A&Ox3, mildly slurred speech, right facial droop, right face sensory deficit noted  PSYCH: flat affect      LABS:                        13.7   10.9  )-----------( 256      ( 24 Apr 2019 05:06 )             42.3     04-24    139  |  100  |  23.0<H>  ----------------------------<  102  3.6   |  26.0  |  0.89    Ca    8.9      24 Apr 2019 05:06  Phos  3.4     04-24  Mg     2.3     04-24        RADIOLOGY & ADDITIONAL TESTS:    MR head w IV contrast 4/24/19  IMPRESSION: No abnormal enhancement. Stable right middle cerebellar   peduncle infarct.    MRA brain 4/24/19  IMPRESSION: Unremarkable MRA of the brain.      MRA neck 4/24/19  IMPRESSION:  Unremarkable contrast-enhanced MRA of the neck.         MEDICATIONS:  MEDICATIONS  (STANDING):  ALBUTerol    90 MICROgram(s) HFA Inhaler 1 Puff(s) Inhalation every 4 hours  amoxicillin  875 milliGRAM(s)/clavulanate 1 Tablet(s) Oral two times a day  aspirin enteric coated 325 milliGRAM(s) Oral daily  enoxaparin Injectable 40 milliGRAM(s) SubCutaneous every 24 hours  hydrochlorothiazide 25 milliGRAM(s) Oral daily  lisinopril 20 milliGRAM(s) Oral daily  simvastatin 40 milliGRAM(s) Oral at bedtime  sodium chloride 0.9%. 1000 milliLiter(s) (60 mL/Hr) IV Continuous <Continuous>  tiotropium 18 MICROgram(s) Capsule 1 Capsule(s) Inhalation daily    MEDICATIONS  (PRN):  acetaminophen   Tablet .. 650 milliGRAM(s) Oral every 6 hours PRN Temp greater or equal to 38C (100.4F), Mild Pain (1 - 3)  diphenhydrAMINE   Injectable 50 milliGRAM(s) IV Push every 4 hours PRN Rash and/or Itching
[place] [facility]                                                        Office: [address] [address]                                                       Telephone: [phone]. Fax:[phone]                                                                             PROGRESS NOTE   Reason for follow up: cerebrovascular accident                             Overnight: No new events.       Update:    LUZ MARINA done yesterday. No cardioembolic source of stroke on LUZ MARINA.   patient ambulates to the bathroom.   Unsteady gait.    Subjective: "i am fine"   Complains of:  No new symptoms.  unsteadiness.   Review of symptoms: Cardiac:  No chest pain. No dyspnea. No palpitations.  Respiratory:no cough. No dyspnea  Gastrointestinal: No diarrhea. No abdominal pain. No bleeding.     Past medical history: No updates.   Chronic conditions:  Hypertension: controlled.   	  Vitals:  ICU Vital Signs Last 24 Hrs  T(C): 37 (25 Apr 2019 21:33), Max: 37.1 (25 Apr 2019 06:01)  T(F): 98.6 (25 Apr 2019 21:33), Max: 98.7 (25 Apr 2019 06:01)  HR: 77 (25 Apr 2019 21:33) (77 - 85)  BP: 167/99 (25 Apr 2019 21:33) (130/82 - 184/83)  BP(mean): --  ABP: --  ABP(mean): --  RR: 16 (25 Apr 2019 21:33) (16 - 18)  SpO2: 97% (25 Apr 2019 21:33) (97% - 100%)        PHYSICAL EXAM:  Appearance: Comfortable. No acute distress  HEENT:  Head and neck: Atraumatic. Normocephalic.  Normal oral mucosa, PERRL, Neck is supple. No JVD, No carotid bruit.   Neurologic: A & O x 3,  right facial droop. slurred speech.  Lymphatic: No cervical lymphadenopathy  Cardiovascular: Normal S1 S2, No murmur, rubs/gallops. No JVD, No edema  Respiratory: Lungs clear to auscultation  Gastrointestinal:  Soft, Non-tender, + BS  Lower Extremities: No edema  Psychiatry: Patient is calm. No agitation. Mood & affect appropriate  Skin: No rashes/ ecchymoses/cyanosis/ulcers visualized on the face, hands or feet.    CURRENT MEDICATIONS:  MEDICATIONS  (STANDING):  amLODIPine   Tablet 5 milliGRAM(s) Oral daily  hydrochlorothiazide 25 milliGRAM(s) Oral daily  lisinopril 20 milliGRAM(s) Oral daily    ALBUTerol    0.083%  ALBUTerol    90 MICROgram(s) HFA Inhaler  benzonatate  tiotropium 18 MICROgram(s) Capsule  amoxicillin  875 milliGRAM(s)/clavulanate  aspirin enteric coated  clopidogrel Tablet  dexamethasone/neomycin/polymyxin Suspension  enoxaparin Injectable  fluticasone propionate 50 MICROgram(s)/spray Nasal Spray  simvastatin    PRN: acetaminophen   Tablet .. PRN  guaiFENesin    Syrup PRN        LABS:	 	                        13.7   10.9  )-----------( 256      ( 24 Apr 2019 05:06 )             42.3   N=x    ; L=x        25 Apr 2019 12:03    138    |  98     |  20.0   ----------------------------<  106    3.6     |  26.0   |  0.88     Ca    9.4        25 Apr 2019 12:03  Phos  3.4       24 Apr 2019 05:06  Mg     2.3       24 Apr 2019 05:06        proBNP:   Lipid Profile: Date: 04-22 @ 05:44  Total cholesterol 173; Direct LDL: 123; HDL: 25; Triglycerides:126    HgA1c: Hemoglobin A1C, Whole Blood: 6.6 %  TSH:     TELEMETRY: Reviewed  : sinus . no events.   ECG:  Reviewed by me. 	< from: 12 Lead ECG (04.21.19 @ 19:31) >  Normal sinus rhythm  Nonspecific T wave abnormality  Abnormal ECG    < end of copied text >      DIAGNOSTIC TESTING:  [ ] Echocardiogram: pending   MRI:   < from: MR Head No Cont (04.22.19 @ 11:40) >  IMPRESSION:    Acutenonhemorrhagic infarct in the right middle cerebellar peduncle.  Scattered mucosal disease of the paranasal sinuses and polypoid retention  cyst in the maxillary sinuses bilaterally.  Inflammatory changes left mastoid.  < end of copied text >
[place] [facility]                                                        Office: [address] [address]                                                       Telephone: [phone]. Fax:[phone]                                                                             PROGRESS NOTE   Reason for follow up: cerebrovascular accident                             Overnight: No new events.   Update: patient could not get LUZ MARINA today as patient was given apple sauce with meds today am.      Subjective: "i am feeling ok"   Complains of:  No new symptoms.   Review of symptoms: Cardiac:  No chest pain. No dyspnea. No palpitations.  Respiratory:no cough. No dyspnea  Gastrointestinal: No diarrhea. No abdominal pain. No bleeding.     Past medical history: No updates.   Chronic conditions:  Hypertension: controlled.   	  Vitals:  T(C): 36.8 (04-23-19 @ 17:22), Max: 37.4 (04-23-19 @ 14:28)  HR: 85 (04-23-19 @ 16:00) (70 - 93)  BP: 137/89 (04-23-19 @ 16:00) (120/87 - 167/114)  RR: 13 (04-23-19 @ 16:00) (13 - 18)  SpO2: 98% (04-23-19 @ 16:00) (95% - 100%)  Wt(kg): --  I&O's Summary    23 Apr 2019 07:01  -  23 Apr 2019 19:18  --------------------------------------------------------  IN: 540 mL / OUT: 200 mL / NET: 340 mL      Weight (kg): 95.3 (04-21 @ 18:06)    PHYSICAL EXAM:  Appearance: Comfortable. No acute distress  HEENT:  Head and neck: Atraumatic. Normocephalic.  Normal oral mucosa, PERRL, Neck is supple. No JVD, No carotid bruit.   Neurologic: A & O x 3,  right facial droop. slurred speech.  Lymphatic: No cervical lymphadenopathy  Cardiovascular: Normal S1 S2, No murmur, rubs/gallops. No JVD, No edema  Respiratory: Lungs clear to auscultation  Gastrointestinal:  Soft, Non-tender, + BS  Lower Extremities: No edema  Psychiatry: Patient is calm. No agitation. Mood & affect appropriate  Skin: No rashes/ ecchymoses/cyanosis/ulcers visualized on the face, hands or feet.    CURRENT MEDICATIONS:  hydrochlorothiazide 25 milliGRAM(s) Oral daily  lisinopril 20 milliGRAM(s) Oral daily    ALBUTerol    90 MICROgram(s) HFA Inhaler  tiotropium 18 MICROgram(s) Capsule  amoxicillin  875 milliGRAM(s)/clavulanate  meclizine  simvastatin  aspirin enteric coated  enoxaparin Injectable  sodium chloride 0.9%.      LABS:	 	  CARDIAC MARKERS ( 22 Apr 2019 05:44 )  x     / <0.01 ng/mL / x     / x     / x      p-BNP 22 Apr 2019 05:44: x    , CARDIAC MARKERS ( 21 Apr 2019 21:01 )  x     / <0.01 ng/mL / x     / x     / x      p-BNP 21 Apr 2019 21:01: x                                14.3   11.3  )-----------( 269      ( 23 Apr 2019 06:48 )             43.6     04-23    136  |  96<L>  |  22.0<H>  ----------------------------<  125<H>  4.1   |  26.0  |  1.05    Ca    9.2      23 Apr 2019 06:48  Phos  3.2     04-23  Mg     2.4     04-23      proBNP:   Lipid Profile: Date: 04-22 @ 05:44  Total cholesterol 173; Direct LDL: 123; HDL: 25; Triglycerides:126    HgA1c: Hemoglobin A1C, Whole Blood: 6.6 %  TSH:     TELEMETRY: Reviewed  : sinus   ECG:  Reviewed by me. 	< from: 12 Lead ECG (04.21.19 @ 19:31) >  Normal sinus rhythm  Nonspecific T wave abnormality  Abnormal ECG    < end of copied text >      DIAGNOSTIC TESTING:  [ ] Echocardiogram: pending   MRI:   < from: MR Head No Cont (04.22.19 @ 11:40) >  IMPRESSION:    Acutenonhemorrhagic infarct in the right middle cerebellar peduncle.  Scattered mucosal disease of the paranasal sinuses and polypoid retention  cyst in the maxillary sinuses bilaterally.  Inflammatory changes left mastoid.  < end of copied text >
Patient in bed, feels well, but is fatigued.  Reports that his face is still numb and swollen.   Eating ok and tolerating diet.   Reports no pain.   Continues to have difficulty with nasal drainage and cough.     LUZ MARINA reviewed:   1. Technically good study.   2. Normal global left ventricular systolic function.   3. Left ventricular ejection fraction, by visual estimation, is 60 to   65%.   4. Color flow doppler and intravenous injection of agitated saline   demonstrates the presence of an intact intra atrial septum.   5. Normal right ventricular size and function.   6. Trace mitral valve regurgitation.   7. Trivial pericardial effusion.   8. There is no identifiable cause for cardiac source of emboli.      FUNCTIONAL PROGRESS  4/24  Bed Mobility  Bed Mobility Training Sit-to-Supine: supervsion  Bed Mobility Training Supine-to-Sit: supervsion  Bed Mobility Training Limitations: decreased ability to use legs for bridging/pushing    Sit-Stand Transfer Training  Transfer Training Sit-to-Stand Transfer: minimum assist (75% patient effort);  1 person assist;  full weight-bearing  Transfer Training Stand-to-Sit Transfer: minimum assist (75% patient effort);  1 person assist;  weight-bearing as tolerated  Sit-to-Stand Transfer Training Transfer Safety Analysis: decreased balance;  impaired balance;  decreased sensation;  impaired coordination    Gait Training  Gait Training: minimum assist (75% patient effort);  1 person assist;  weight-bearing as tolerated   25 feet  Gait Analysis: 3-point gait   decreased step length;  impaired balance;  decreased strength;  25 feet  Gait Number of Times:: x 2        REVIEW OF SYSTEMS  Constitutional - No fever,  +fatigue  HEENT - No vertigo, No neck pain  Neurological - No headaches, No memory loss, No loss of strength, +numbness, No tremors  Skin - No rashes, No lesions   Musculoskeletal - No joint pain, +joint swelling, No muscle pain  Psychiatric - No depression, No anxiety    VITALS  T(C): 37.1 (04-25-19 @ 06:01), Max: 37.2 (04-24-19 @ 16:00)  HR: 80 (04-25-19 @ 09:49) (70 - 91)  BP: 137/89 (04-25-19 @ 06:01) (131/84 - 158/119)  RR: 16 (04-25-19 @ 06:01) (15 - 21)  SpO2: 98% (04-25-19 @ 09:49) (96% - 100%)  Wt(kg): --    MEDICATIONS   acetaminophen   Tablet .. 650 milliGRAM(s) every 6 hours PRN  ALBUTerol    0.083% 2.5 milliGRAM(s) every 6 hours  ALBUTerol    90 MICROgram(s) HFA Inhaler 1 Puff(s) every 4 hours  amoxicillin  875 milliGRAM(s)/clavulanate 1 Tablet(s) two times a day  aspirin enteric coated 325 milliGRAM(s) daily  benzonatate 100 milliGRAM(s) three times a day  dexamethasone/neomycin/polymyxin Suspension 1 Drop(s) three times a day  enoxaparin Injectable 40 milliGRAM(s) every 24 hours  fluticasone propionate 50 MICROgram(s)/spray Nasal Spray 1 Spray(s) two times a day  guaiFENesin    Syrup 200 milliGRAM(s) every 6 hours PRN  hydrochlorothiazide 25 milliGRAM(s) daily  lisinopril 20 milliGRAM(s) daily  simvastatin 40 milliGRAM(s) at bedtime  tiotropium 18 MICROgram(s) Capsule 1 Capsule(s) daily      RECENT LABS - Reviewed                        13.7   10.9  )-----------( 256      ( 24 Apr 2019 05:06 )             42.3     04-24    139  |  100  |  23.0<H>  ----------------------------<  102  3.6   |  26.0  |  0.89    Ca    8.9      24 Apr 2019 05:06  Phos  3.4     04-24  Mg     2.3     04-24              --------------------------------------------------------------------------------  PHYSICAL EXAM  Constitutional - NAD, Comfortable  HEENT - Right facial swelling  Chest - Nasal congestion  Extremities - No C/C/E, No calf tenderness   Neurologic Exam -                    Cognitive - Awake, Alert, AAO to self, place, date, year, situation     Communication - Fluent, +dysarthria     Cranial Nerves - Right peripheral facial weakness     Motor - No focal deficits     Sensory - Facial numbness on right     Coordination - Impaired on the right   Psychiatric - Mood stable, Affect Flat  ----------------------------------------------------------------------------------------  ASSESSMENT/PLAN  38yMale with functional deficits after an acute cerebellar CVA and Bell's Palsy/sinusitis  Acute CVA - Zocor, ASA  Acute sinusitis/Cough  - Augmentin, Tessalon, Robitussin  HTN - Lisinopril  Pain - Tylenol  Bell's Palsy - Soft/Nectar - likely progress to MS/Thins given dysfunction is orally  DVT PPX - SCDs, Lovenox  Rehab - Expect patient to achieve functional goals for DC HOME with OUTPATIENT. OT ordered to address ADLs and safe DC home. 	    Will continue to follow. Functional progress will determine rehab dispo recommendations.     Continue bedside therapy as well as OOB throughout the day with mobilization by staff to maintain cardiopulmonary function and prevention of secondary complications related to debility.
Patient in chair, just finished working with PT.   Discussed with PT about his progress. Would need some supervision at home for his ataxia.   Had no loss of balance, but would need the assist if needed.   Patient continues to have an intermittent cough.   Swelling is slightly improved.     FUNCTIONAL PROGRESS  4/26  Progress Summary: chart reviewed and noted. pt received semi williamson position in bed, NAD, cardiac monitor, agreeable to PT. pt requiring supervision for safety due to mild unsteadiness c turns c self recovery. pt left in chair at bedside, NAD, will follow, 0/10 pain throughout session, all lines intact. CCC and RN aware of function       REVIEW OF SYSTEMS  Constitutional - No fever,  No fatigue  HEENT - No vertigo, No neck pain  Neurological - No headaches, No memory loss, No loss of strength, No numbness, No tremors  Skin - No rashes, No lesions   Musculoskeletal - No joint pain, +joint swelling, No muscle pain  Psychiatric - No depression, No anxiety    VITALS  T(C): 36.9 (04-26-19 @ 05:26), Max: 37 (04-25-19 @ 21:33)  HR: 87 (04-26-19 @ 08:43) (65 - 88)  BP: 149/98 (04-26-19 @ 05:26) (130/82 - 184/83)  RR: 16 (04-26-19 @ 05:26) (16 - 18)  SpO2: 99% (04-26-19 @ 08:43) (97% - 100%)  Wt(kg): --    MEDICATIONS   acetaminophen   Tablet .. 650 milliGRAM(s) every 6 hours PRN  ALBUTerol    0.083% 2.5 milliGRAM(s) every 6 hours  ALBUTerol    90 MICROgram(s) HFA Inhaler 1 Puff(s) every 4 hours  amLODIPine   Tablet 5 milliGRAM(s) daily  amoxicillin  875 milliGRAM(s)/clavulanate 1 Tablet(s) two times a day  aspirin enteric coated 325 milliGRAM(s) daily  benzonatate 100 milliGRAM(s) three times a day  clopidogrel Tablet 75 milliGRAM(s) daily  dexamethasone/neomycin/polymyxin Suspension 1 Drop(s) three times a day  enoxaparin Injectable 40 milliGRAM(s) every 24 hours  fluticasone propionate 50 MICROgram(s)/spray Nasal Spray 1 Spray(s) two times a day  guaiFENesin    Syrup 200 milliGRAM(s) every 6 hours PRN  hydrochlorothiazide 25 milliGRAM(s) daily  lisinopril 20 milliGRAM(s) daily  simvastatin 40 milliGRAM(s) at bedtime  tiotropium 18 MICROgram(s) Capsule 1 Capsule(s) daily      RECENT LABS - Reviewed    04-25    138  |  98  |  20.0  ----------------------------<  106  3.6   |  26.0  |  0.88    Ca    9.4      25 Apr 2019 12:03              --------------------------------------------------------------------------------  PHYSICAL EXAM  Constitutional - NAD, Comfortable  HEENT - Right facial swelling  Chest - Nasal congestion  Extremities - No C/C/E, No calf tenderness   Neurologic Exam -                    Cognitive - Awake, Alert, AAO to self, place, date, year, situation     Communication - Fluent, +dysarthria     Cranial Nerves - Right peripheral facial weakness     Motor - No focal deficits     Sensory - Facial numbness on right     Coordination - Impaired on the right   Psychiatric - Mood stable, Affect Flat  ----------------------------------------------------------------------------------------  ASSESSMENT/PLAN  38yMale with functional deficits after an acute cerebellar CVA and Bell's Palsy/sinusitis  Acute CVA - Zocor, ASA, Plavix  Acute sinusitis/Cough - Augmentin, Tessalon, Robitussin, Duoneb, Flonase  HTN - Lisinopril, Norvasc, HCTZ  Pain - Tylenol  Bell's Palsy - MS/Thins    DVT PPX - SCDs, Lovenox  Rehab - PT, SW and RN working towards home today. OT eval pending. PT to work with aunt to assist in transition home. At this time, would recommend HOME CARE given above.     Will continue to follow. Functional progress will determine rehab dispo recommendations.     Continue bedside therapy as well as OOB throughout the day with mobilization by staff to maintain cardiopulmonary function and prevention of secondary complications related to debility.
[place] [facility]                                                        Office: [address] [address]                                                       Telephone: [phone]. Fax:[phone]                                                                             PROGRESS NOTE   Reason for follow up: cerebrovascular accident                             Overnight: No new events.       Update:  scheduled for LUZ MARINA today.    Subjective: "i am ok"   Complains of:  No new symptoms.   Review of symptoms: Cardiac:  No chest pain. No dyspnea. No palpitations.  Respiratory:no cough. No dyspnea  Gastrointestinal: No diarrhea. No abdominal pain. No bleeding.     Past medical history: No updates.   Chronic conditions:  Hypertension: controlled.   	  Vitals:  Vital Signs Last 24 Hrs  T(C): 37 (04-24-19 @ 22:43), Max: 37.2 (04-24-19 @ 05:09)  T(F): 98.6 (04-24-19 @ 22:43), Max: 99 (04-24-19 @ 16:00)  HR: 77 (04-24-19 @ 22:43) (58 - 91)  BP: 153/85 (04-24-19 @ 22:43) (123/92 - 158/119)  BP(mean): 104 (04-24-19 @ 05:14) (100 - 104)  RR: 16 (04-24-19 @ 22:43) (14 - 21)  SpO2: 98% (04-24-19 @ 22:43) (96% - 100%)    PHYSICAL EXAM:  Appearance: Comfortable. No acute distress  HEENT:  Head and neck: Atraumatic. Normocephalic.  Normal oral mucosa, PERRL, Neck is supple. No JVD, No carotid bruit.   Neurologic: A & O x 3,  right facial droop. slurred speech.  Lymphatic: No cervical lymphadenopathy  Cardiovascular: Normal S1 S2, No murmur, rubs/gallops. No JVD, No edema  Respiratory: Lungs clear to auscultation  Gastrointestinal:  Soft, Non-tender, + BS  Lower Extremities: No edema  Psychiatry: Patient is calm. No agitation. Mood & affect appropriate  Skin: No rashes/ ecchymoses/cyanosis/ulcers visualized on the face, hands or feet.    CURRENT MEDICATIONS:  MEDICATIONS  (STANDING):  hydrochlorothiazide 25 milliGRAM(s) Oral daily  lisinopril 20 milliGRAM(s) Oral daily    ALBUTerol    0.083%  ALBUTerol    90 MICROgram(s) HFA Inhaler  benzonatate  tiotropium 18 MICROgram(s) Capsule  amoxicillin  875 milliGRAM(s)/clavulanate  aspirin enteric coated  dexamethasone/neomycin/polymyxin Suspension  enoxaparin Injectable  fluticasone propionate 50 MICROgram(s)/spray Nasal Spray  simvastatin  sodium chloride 0.9%.    PRN: acetaminophen   Tablet .. PRN  guaiFENesin    Syrup PRN      LABS:	 	  CARDIAC MARKERS ( 22 Apr 2019 05:44 )  x     / <0.01 ng/mL / x     / x     / x                            13.7   10.9  )-----------( 256      ( 24 Apr 2019 05:06 )             42.3   N=x    ; L=x        24 Apr 2019 05:06    139    |  100    |  23.0   ----------------------------<  102    3.6     |  26.0   |  0.89     Ca    8.9        24 Apr 2019 05:06  Phos  3.4       24 Apr 2019 05:06  Mg     2.3       24 Apr 2019 05:06      proBNP:   Lipid Profile: Date: 04-22 @ 05:44  Total cholesterol 173; Direct LDL: 123; HDL: 25; Triglycerides:126    HgA1c: Hemoglobin A1C, Whole Blood: 6.6 %  TSH:     TELEMETRY: Reviewed  : sinus   ECG:  Reviewed by me. 	< from: 12 Lead ECG (04.21.19 @ 19:31) >  Normal sinus rhythm  Nonspecific T wave abnormality  Abnormal ECG    < end of copied text >      DIAGNOSTIC TESTING:  [ ] Echocardiogram: pending   MRI:   < from: MR Head No Cont (04.22.19 @ 11:40) >  IMPRESSION:    Acutenonhemorrhagic infarct in the right middle cerebellar peduncle.  Scattered mucosal disease of the paranasal sinuses and polypoid retention  cyst in the maxillary sinuses bilaterally.  Inflammatory changes left mastoid.  < end of copied text >
This is 64 y/o Black M with a PMHx of HTN presented to the ED with c/o suspected allergic reaction (2 days ago). Pt states he was recently placed on a zpak for treatment of congestion and URI like sxs. Took first dose, 4/21/19, and developed nausea and vomiting. Woke up 2 days ago with right sided facial numbness and feeling like it is swollen and dizziness.   Now s/p LUZ MARINA with Dr. Montenegro.    REVIEW OF SYSTEMS: +productive cough  Denies SOB, CP, NV, HA, dizziness, palpitations.    PHYSICAL EXAM:   NEURO: A&Ox3 NAD  NECK: No JVD, trachea midline. Eupneic  HEART: sinus on tele 94 bpm, S1,S2  PULMONARY:  CTA [place]  ABDOMEN: Soft nontender X4 +BS   EXTREMITIES: palpable pulses x4 extremities, no edema present    < from: LUZ MARINA Echo Doppler (04.24.19 @ 13:50) >  Summary:   1. Technically good study.   2. Normal global left ventricular systolic function.   3. Left ventricular ejection fraction, by visual estimation, is 60 to 65%.   4. Color flow doppler and intravenous injection of agitated saline demonstrates the presence of an intact intra atrial septum.   5. Normal right ventricular size and function.   6. Trace mitral valve regurgitation.   7. Trivial pericardial effusion.   8. There is no identifiable cause for cardiac source of emboli.    -post LUZ MARINA orders  -May return to floor when recovery time complete and patient is stable.
This is 64 y/o Black M with a PMHx of HTN presented to the ED with c/o suspected allergic reaction (2 days ago). Pt states he was recently placed on a zpak for treatment of congestion and URI like sxs. Took first dose, 4/21/19, and developed nausea and vomiting. Woke up 2 days ago with right sided facial numbness and feeling like it is swollen and dizziness. Denies HA, CP, and SOB. NO hx of the same. Pt's BP elevated in the ED. Presents today for LUZ MARINA with Dr. Montenegro.      < from: MR Head w/ IV Cont (04.24.19 @ 11:00) >  FINDINGS:    Stable small to moderate right cerebellar peduncle infarct measuring 2.2   cm on image 10, series 3. No new infarction. No abnormal leptomeningeal   or parenchymal enhancement.      IMPRESSION: No abnormal enhancement. Stable right middle cerebellar   peduncle infarct.    REVIEW OF SYSTEMS: +productive cough  Denies SOB, CP, NV, HA, dizziness, palpitations.    PHYSICAL EXAM:   NEURO: A&Ox3 NAD  NECK: No JVD, trachea midline. Eupneic  HEART: sinus on tele 94 bpm, S1,S2  PULMONARY:  CTA [place]  ABDOMEN: Soft nontender X4 +BS   EXTREMITIES: palpable pulses x4 extremities, no edema present      ASA: 2  Mallampati: per anesthesia    -consent for LUZ MARINA with Dr. Montenegro  -procedure d/w patient; risks and benefits explained, questions answered  -labs, ECG, scans reviewed
INTERVAL HPI/OVERNIGHT EVENTS:    CC: acute CVA, hypertension, sinusitis      Patient seen at 10 am initially, chart reviewed prior to evaluation. He reported right facial numbness, which has worsened since admission, and with right leg weakness. Non compliant with anti hypertensives. Was hypertensive on admission. Denies drug abuse. Urgent MRI brain was ordered based on complaints and exam, positive for acute CVA. Discussed with patient findings of exam, upgraded to stroke unit for neuro checks. Code stroke was not called as patient outside tPA window.     Vital Signs Last 24 Hrs  T(C): 37.1 (22 Apr 2019 11:44), Max: 37.2 (21 Apr 2019 22:00)  T(F): 98.8 (22 Apr 2019 11:44), Max: 99 (21 Apr 2019 22:00)  HR: 70 (22 Apr 2019 11:44) (60 - 74)  BP: 138/84 (22 Apr 2019 11:44) (130/80 - 193/101)  BP(mean): --  RR: 18 (22 Apr 2019 11:44) (18 - 20)  SpO2: 96% (22 Apr 2019 11:44) (96% - 100%)    PHYSICAL EXAM:    GENERAL: alert, not in distress, oriented x 3  CHEST/LUNG: b/l air entry, clear  HEART: Regular  ABDOMEN: Soft, BS+, non tender  EXTREMITIES:  no edema, tenderness  NEURO: decreased sensation on right side of face, + drift right LE. power 5/5, speech mildly impaired.     MEDICATIONS  (STANDING):  ALBUTerol    90 MICROgram(s) HFA Inhaler 1 Puff(s) Inhalation every 4 hours  ALBUTerol/ipratropium for Nebulization 3 milliLiter(s) Nebulizer every 4 hours  amoxicillin  875 milliGRAM(s)/clavulanate 1 Tablet(s) Oral two times a day  aspirin enteric coated 325 milliGRAM(s) Oral daily  enoxaparin Injectable 40 milliGRAM(s) SubCutaneous every 24 hours  hydrochlorothiazide 25 milliGRAM(s) Oral daily  lisinopril 20 milliGRAM(s) Oral daily  meclizine 50 milliGRAM(s) Oral two times a day  simvastatin 40 milliGRAM(s) Oral at bedtime  tiotropium 18 MICROgram(s) Capsule 1 Capsule(s) Inhalation daily    MEDICATIONS  (PRN):  acetaminophen   Tablet .. 650 milliGRAM(s) Oral every 6 hours PRN Temp greater or equal to 38C (100.4F), Mild Pain (1 - 3)  diphenhydrAMINE   Injectable 50 milliGRAM(s) IV Push every 4 hours PRN Rash and/or Itching      Allergies    No Known Allergies    Intolerances          LABS:                          14.8   14.4  )-----------( 317      ( 21 Apr 2019 21:02 )             44.8     04-21    139  |  98  |  23.0<H>  ----------------------------<  116<H>  3.7   |  27.0  |  1.05    Ca    9.6      21 Apr 2019 21:01      PT/INR - ( 21 Apr 2019 21:02 )   PT: 13.8 sec;   INR: 1.19 ratio         PTT - ( 21 Apr 2019 21:02 )  PTT:33.8 sec      RADIOLOGY & ADDITIONAL TESTS:

## 2019-04-26 NOTE — PROGRESS NOTE ADULT - ASSESSMENT
38 yr old male with hypertension admitted with complaints of facial numbness and dizziness. He was recently started on a course of Azithromycin for URI. On arrival, noted to have elevated BP systolic 180, CT head was performed which was negative for acute stroke, there was evidence of paranasal sinus disease and left mastoid opacification. He was started on Levofloxacin for URI. 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 Neurology consulted. Advised MRA head and neck, which showed no abnormalities. LUZ MARINA was performed, showed no abnormalities. Speech and swallow was consulted, initially advised soft with nectar thick and later advanced to soft with thin liquids. PMR was consulted. PT and OT advised outpatient/home PT.

## 2019-04-26 NOTE — PROGRESS NOTE ADULT - REASON FOR ADMISSION
r/o allergic reaction; uncontrol HTN; suspected CVA
uncontrol HTN; suspected CVA
dizziness
r/o allergic reaction; uncontrol HTN; suspected CVA
r/o allergic reaction; uncontrol HTN; suspected CVA

## 2019-04-26 NOTE — PROGRESS NOTE ADULT - PROBLEM SELECTOR PROBLEM 1
Acute CVA (cerebrovascular accident)

## 2019-04-26 NOTE — OCCUPATIONAL THERAPY INITIAL EVALUATION ADULT - PERTINENT HX OF CURRENT PROBLEM, REHAB EVAL
Pt presents to ED with c/o right sided facial numbness/swelling and dizziness after being place on z-pack for treatment of congestion/URI; initially suspected allergic reaction. Head MRI reveals acute nonhemorrhagic infarct in the right middle cerebellar peduncle; scattered mucosal disease of the paranasal sinuses and polypoid retention cyst in the maxillary sinuses bilaterally; inflammatory changes left mastoid.

## 2019-04-26 NOTE — PROGRESS NOTE ADULT - PROVIDER SPECIALTY LIST ADULT
Cardiology
Hospitalist
Neurology
Rehab Medicine
Rehab Medicine
Hospitalist

## 2019-04-26 NOTE — PROGRESS NOTE ADULT - ATTENDING COMMENTS
Discussed with patient and aunt at bedside.  Discussed with PT and case management. Stable for discharge home.

## 2019-04-26 NOTE — OCCUPATIONAL THERAPY INITIAL EVALUATION ADULT - NS ASR FOLLOW COMMAND OT EVAL
word finding difficulty/delay with some words/100% of the time/able to follow single-step instructions

## 2019-04-26 NOTE — PROGRESS NOTE ADULT - PROBLEM SELECTOR PLAN 3
Change antibiotics to Augmentin. Outpatient ENT evaluation.
Change antibiotics to Augmentin. Outpatient ENT evaluation.
Complete course of Augmentin. Outpatient ENT evaluation.
Complete course of Augmentin. Outpatient ENT evaluation.
Change antibiotics to Augmentin. Outpatient ENT evaluation.

## 2019-04-26 NOTE — PROGRESS NOTE ADULT - PROBLEM SELECTOR PLAN 1
Continue aspirin, plavix, statin. LUZ MARINA and MRA results noted. Cardiology follow up. Needs PT on discharge.

## 2019-05-02 PROBLEM — J32.9 CHRONIC SINUSITIS, UNSPECIFIED: Chronic | Status: ACTIVE | Noted: 2019-04-23

## 2019-05-02 PROBLEM — I10 ESSENTIAL (PRIMARY) HYPERTENSION: Chronic | Status: ACTIVE | Noted: 2019-04-22

## 2019-05-03 ENCOUNTER — APPOINTMENT (OUTPATIENT)
Dept: CARDIOLOGY | Facility: CLINIC | Age: 39
End: 2019-05-03
Payer: MEDICAID

## 2019-05-03 ENCOUNTER — NON-APPOINTMENT (OUTPATIENT)
Age: 39
End: 2019-05-03

## 2019-05-03 VITALS
HEART RATE: 77 BPM | HEIGHT: 66 IN | DIASTOLIC BLOOD PRESSURE: 83 MMHG | OXYGEN SATURATION: 99 % | SYSTOLIC BLOOD PRESSURE: 121 MMHG | WEIGHT: 270 LBS | BODY MASS INDEX: 43.39 KG/M2

## 2019-05-03 DIAGNOSIS — E11.9 TYPE 2 DIABETES MELLITUS W/OUT COMPLICATIONS: ICD-10-CM

## 2019-05-03 PROCEDURE — 93000 ELECTROCARDIOGRAM COMPLETE: CPT

## 2019-05-03 PROCEDURE — 99215 OFFICE O/P EST HI 40 MIN: CPT | Mod: 25

## 2019-06-03 ENCOUNTER — APPOINTMENT (OUTPATIENT)
Dept: NEUROLOGY | Facility: CLINIC | Age: 39
End: 2019-06-03
Payer: COMMERCIAL

## 2019-06-03 VITALS
HEIGHT: 66 IN | SYSTOLIC BLOOD PRESSURE: 120 MMHG | WEIGHT: 240 LBS | BODY MASS INDEX: 38.57 KG/M2 | DIASTOLIC BLOOD PRESSURE: 80 MMHG

## 2019-06-03 PROCEDURE — 99204 OFFICE O/P NEW MOD 45 MIN: CPT

## 2019-06-05 ENCOUNTER — OUTPATIENT (OUTPATIENT)
Dept: OUTPATIENT SERVICES | Facility: HOSPITAL | Age: 39
LOS: 1 days | End: 2019-06-05
Payer: COMMERCIAL

## 2019-06-05 DIAGNOSIS — R27.9 UNSPECIFIED LACK OF COORDINATION: ICD-10-CM

## 2019-06-05 DIAGNOSIS — Z51.89 ENCOUNTER FOR OTHER SPECIFIED AFTERCARE: ICD-10-CM

## 2019-06-05 DIAGNOSIS — I63.9 CEREBRAL INFARCTION, UNSPECIFIED: ICD-10-CM

## 2019-06-06 DIAGNOSIS — I69.351 HEMIPLEGIA AND HEMIPARESIS FOLLOWING CEREBRAL INFARCTION AFFECTING RIGHT DOMINANT SIDE: ICD-10-CM

## 2019-06-07 DIAGNOSIS — I69.322 DYSARTHRIA FOLLOWING CEREBRAL INFARCTION: ICD-10-CM

## 2019-06-17 ENCOUNTER — APPOINTMENT (OUTPATIENT)
Dept: NEUROLOGY | Facility: CLINIC | Age: 39
End: 2019-06-17

## 2019-06-25 PROCEDURE — 97163 PT EVAL HIGH COMPLEX 45 MIN: CPT

## 2019-06-25 PROCEDURE — 97112 NEUROMUSCULAR REEDUCATION: CPT

## 2019-06-25 PROCEDURE — 92523 SPEECH SOUND LANG COMPREHEN: CPT

## 2019-06-25 PROCEDURE — G0515: CPT

## 2019-06-25 PROCEDURE — 97110 THERAPEUTIC EXERCISES: CPT

## 2019-06-25 PROCEDURE — 97166 OT EVAL MOD COMPLEX 45 MIN: CPT

## 2019-06-26 ENCOUNTER — NON-APPOINTMENT (OUTPATIENT)
Age: 39
End: 2019-06-26

## 2019-06-26 ENCOUNTER — APPOINTMENT (OUTPATIENT)
Dept: CARDIOLOGY | Facility: CLINIC | Age: 39
End: 2019-06-26
Payer: COMMERCIAL

## 2019-06-26 DIAGNOSIS — E78.5 HYPERLIPIDEMIA, UNSPECIFIED: ICD-10-CM

## 2019-06-26 PROCEDURE — 93000 ELECTROCARDIOGRAM COMPLETE: CPT

## 2019-06-26 PROCEDURE — 99215 OFFICE O/P EST HI 40 MIN: CPT

## 2019-07-11 ENCOUNTER — APPOINTMENT (OUTPATIENT)
Dept: CARDIOLOGY | Facility: CLINIC | Age: 39
End: 2019-07-11
Payer: COMMERCIAL

## 2019-07-11 PROCEDURE — 93308 TTE F-UP OR LMTD: CPT

## 2019-08-19 ENCOUNTER — APPOINTMENT (OUTPATIENT)
Dept: NEUROLOGY | Facility: CLINIC | Age: 39
End: 2019-08-19
Payer: COMMERCIAL

## 2019-08-19 VITALS
BODY MASS INDEX: 36.32 KG/M2 | WEIGHT: 226 LBS | DIASTOLIC BLOOD PRESSURE: 70 MMHG | HEIGHT: 66 IN | SYSTOLIC BLOOD PRESSURE: 120 MMHG

## 2019-08-19 DIAGNOSIS — I63.9 CEREBRAL INFARCTION, UNSPECIFIED: ICD-10-CM

## 2019-08-19 DIAGNOSIS — I10 ESSENTIAL (PRIMARY) HYPERTENSION: ICD-10-CM

## 2019-08-19 PROCEDURE — 99214 OFFICE O/P EST MOD 30 MIN: CPT

## 2019-11-18 ENCOUNTER — APPOINTMENT (OUTPATIENT)
Dept: NEUROLOGY | Facility: CLINIC | Age: 39
End: 2019-11-18

## 2020-01-06 DIAGNOSIS — I48.91 UNSPECIFIED ATRIAL FIBRILLATION: ICD-10-CM

## 2020-01-06 RX ORDER — HYDROCHLOROTHIAZIDE 25 MG/1
25 TABLET ORAL DAILY
Qty: 90 | Refills: 3 | Status: ACTIVE | COMMUNITY
Start: 2019-05-03

## 2020-01-06 RX ORDER — CHLORTHALIDONE 25 MG/1
25 TABLET ORAL DAILY
Refills: 0 | Status: ACTIVE | COMMUNITY

## 2020-01-06 RX ORDER — AMLODIPINE BESYLATE 5 MG/1
5 TABLET ORAL DAILY
Qty: 90 | Refills: 3 | Status: ACTIVE | COMMUNITY
Start: 2019-05-03

## 2020-01-06 RX ORDER — ASPIRIN ENTERIC COATED TABLETS 81 MG 81 MG/1
81 TABLET, DELAYED RELEASE ORAL DAILY
Qty: 90 | Refills: 2 | Status: ACTIVE | COMMUNITY
Start: 2019-05-03

## 2020-01-06 RX ORDER — SIMVASTATIN 40 MG/1
40 TABLET, FILM COATED ORAL DAILY
Qty: 90 | Refills: 0 | Status: ACTIVE | COMMUNITY
Start: 2019-05-03

## 2020-01-06 RX ORDER — LISINOPRIL 20 MG/1
20 TABLET ORAL
Qty: 90 | Refills: 1 | Status: ACTIVE | COMMUNITY
Start: 2019-05-03

## 2020-01-06 RX ORDER — METFORMIN HYDROCHLORIDE 500 MG/1
500 TABLET, EXTENDED RELEASE ORAL
Refills: 0 | Status: ACTIVE | COMMUNITY
Start: 2019-05-03

## 2020-01-06 RX ORDER — CLOPIDOGREL BISULFATE 75 MG/1
75 TABLET, FILM COATED ORAL DAILY
Qty: 90 | Refills: 1 | Status: ACTIVE | COMMUNITY
Start: 2019-05-03

## 2020-01-08 ENCOUNTER — APPOINTMENT (OUTPATIENT)
Dept: CARDIOLOGY | Facility: CLINIC | Age: 40
End: 2020-01-08

## 2020-03-30 NOTE — CONSULT NOTE ADULT - PROBLEM/RECOMMENDATION-1
I recommend physical distancing given COVID-19 with only essential interactions in person. As such we are attempting to handle all visits where in person contact is not essential via phone and our physical office location in [place] is currently closed. Please call patient. If patient is amenable and does not have acute concerns that can only be addressed in person, please convert upcoming visit to a video visit via haiku or doxy. me or to a phone visit if patient is unable to use one of these platforms. It is preferable to move this appointment to a sooner available virtual slot if patient is amenable but it is also fine to leave virtual appointment at the existing time if preferred. If acute needs that the patient believes can only be addressed in person, please send me a message (okay to call me at [phone] if urgent) and I will call the patient to make a plan. I am happy to speak directly with the patient there are urgent questions or concerns or if patient were to become ill in the coming weeks; during usual business hours the patient should contact the office at [phone]. I can be reached after usual business hours by calling the office at [phone] and pressing the option to urgently speak with the doctor on call or via cell phone at [phone]. DISPLAY PLAN FREE TEXT

## 2020-11-10 NOTE — PHYSICAL THERAPY INITIAL EVALUATION ADULT - IMPAIRMENTS FOUND, PT EVAL
no weight-bearing restrictions
neuromotor development and sensory integration/gait, locomotion, and balance

## 2022-08-11 ENCOUNTER — NON-APPOINTMENT (OUTPATIENT)
Age: 42
End: 2022-08-11

## 2024-11-07 NOTE — ED ADULT TRIAGE NOTE - AS HEIGHT TYPE
Detail Level: Zone Post-Care Instructions: I reviewed with the patient in detail post-care instructions. Patient is to avoid sunlight for the next 2 days, and wear sun protection. Patients may expect sunburn like redness, discomfort and scabbing. Total Number Of Aks Treated (Optional To Report): 0 Show Anesthesia In Plan?: Yes Incubation Time: 01:00:00 Number Of Kerasticks/Tubes Billed For: 1 Anesthesia Type: 1% lidocaine with epinephrine Which Photosensitizer Was Used: Levulan Show Inventory Tab: Hide Medical Necessity: Precancerous Lesions Occlusion: No Debridement Text (Will Only Render In Visit Note If You Select Debridement Option Under Who Performed The Pdt Field): Prior to application of the photodynamic medication the hyperkeratotic lesions were curetted to make them more amenable to therapy. Pre-Procedure Text: The treatment areas were cleaned and prepped in the usual fashion. Illumination Time: 00:16:40 Who Performed The Pdt (Provider): Baljit Adams Light Source: Sai-U Consent: Written consent obtained.  The risks were reviewed with the patient including but not limited to: pigmentary changes, pain, blistering, scabbing, redness, and the remote possibility of scarring. Who Performed The Pdt?: Performed by MD JASON, HERNAN or NP ([id number]) stated

## 2025-05-12 NOTE — PATIENT PROFILE ADULT - NSPROALCOHOLUSE2_GEN_A_NUR
Tried reaching pt to notify ok to hold metformin and schedule a visit to discuss injectables, no answer, lvm to return clinic call   yes